# Patient Record
Sex: MALE | Race: BLACK OR AFRICAN AMERICAN | NOT HISPANIC OR LATINO | Employment: FULL TIME | URBAN - METROPOLITAN AREA
[De-identification: names, ages, dates, MRNs, and addresses within clinical notes are randomized per-mention and may not be internally consistent; named-entity substitution may affect disease eponyms.]

---

## 2021-04-28 ENCOUNTER — PATIENT MESSAGE (OUTPATIENT)
Dept: RESEARCH | Facility: HOSPITAL | Age: 66
End: 2021-04-28

## 2022-11-18 ENCOUNTER — LAB VISIT (OUTPATIENT)
Dept: LAB | Facility: HOSPITAL | Age: 67
End: 2022-11-18
Payer: COMMERCIAL

## 2022-11-18 ENCOUNTER — LAB VISIT (OUTPATIENT)
Dept: LAB | Facility: HOSPITAL | Age: 67
End: 2022-11-18
Attending: PSYCHIATRY & NEUROLOGY
Payer: COMMERCIAL

## 2022-11-18 ENCOUNTER — OFFICE VISIT (OUTPATIENT)
Dept: INTERNAL MEDICINE | Facility: CLINIC | Age: 67
End: 2022-11-18
Payer: COMMERCIAL

## 2022-11-18 VITALS
WEIGHT: 178.81 LBS | OXYGEN SATURATION: 99 % | RESPIRATION RATE: 20 BRPM | SYSTOLIC BLOOD PRESSURE: 130 MMHG | HEIGHT: 76 IN | HEART RATE: 90 BPM | TEMPERATURE: 98 F | DIASTOLIC BLOOD PRESSURE: 76 MMHG | BODY MASS INDEX: 21.78 KG/M2

## 2022-11-18 DIAGNOSIS — E11.59 HYPERTENSION ASSOCIATED WITH DIABETES: ICD-10-CM

## 2022-11-18 DIAGNOSIS — D17.1 LIPOMA OF BACK: ICD-10-CM

## 2022-11-18 DIAGNOSIS — I15.2 HYPERTENSION ASSOCIATED WITH DIABETES: ICD-10-CM

## 2022-11-18 DIAGNOSIS — E11.9 TYPE 2 DIABETES MELLITUS WITHOUT COMPLICATION, WITHOUT LONG-TERM CURRENT USE OF INSULIN: ICD-10-CM

## 2022-11-18 DIAGNOSIS — Z00.00 ROUTINE GENERAL MEDICAL EXAMINATION AT A HEALTH CARE FACILITY: ICD-10-CM

## 2022-11-18 DIAGNOSIS — E78.5 HYPERLIPIDEMIA ASSOCIATED WITH TYPE 2 DIABETES MELLITUS: ICD-10-CM

## 2022-11-18 DIAGNOSIS — E11.69 HYPERLIPIDEMIA ASSOCIATED WITH TYPE 2 DIABETES MELLITUS: ICD-10-CM

## 2022-11-18 DIAGNOSIS — Z00.00 ROUTINE GENERAL MEDICAL EXAMINATION AT A HEALTH CARE FACILITY: Primary | ICD-10-CM

## 2022-11-18 DIAGNOSIS — I25.10 CORONARY ARTERY DISEASE INVOLVING NATIVE CORONARY ARTERY OF NATIVE HEART WITHOUT ANGINA PECTORIS: ICD-10-CM

## 2022-11-18 LAB
ALBUMIN SERPL BCP-MCNC: 4.1 G/DL (ref 3.5–5.2)
ALBUMIN/CREAT UR: 43.3 UG/MG (ref 0–30)
ALP SERPL-CCNC: 92 U/L (ref 55–135)
ALT SERPL W/O P-5'-P-CCNC: 14 U/L (ref 10–44)
ANION GAP SERPL CALC-SCNC: 11 MMOL/L (ref 8–16)
AST SERPL-CCNC: 26 U/L (ref 10–40)
BILIRUB SERPL-MCNC: 0.6 MG/DL (ref 0.1–1)
BILIRUB UR QL STRIP: NEGATIVE
BUN SERPL-MCNC: 16 MG/DL (ref 8–23)
CALCIUM SERPL-MCNC: 10.1 MG/DL (ref 8.7–10.5)
CHLORIDE SERPL-SCNC: 103 MMOL/L (ref 95–110)
CHOLEST SERPL-MCNC: 169 MG/DL (ref 120–199)
CHOLEST/HDLC SERPL: 4.2 {RATIO} (ref 2–5)
CLARITY UR: CLEAR
CO2 SERPL-SCNC: 24 MMOL/L (ref 23–29)
COLOR UR: YELLOW
COMPLEXED PSA SERPL-MCNC: 7.8 NG/ML (ref 0–4)
CREAT SERPL-MCNC: 1.4 MG/DL (ref 0.5–1.4)
CREAT UR-MCNC: 187 MG/DL (ref 23–375)
EST. GFR  (NO RACE VARIABLE): 55.8 ML/MIN/1.73 M^2
ESTIMATED AVG GLUCOSE: 200 MG/DL (ref 68–131)
GLUCOSE SERPL-MCNC: 165 MG/DL (ref 70–110)
GLUCOSE UR QL STRIP: NEGATIVE
HBA1C MFR BLD: 8.6 % (ref 4–5.6)
HCV AB SERPL QL IA: NORMAL
HDLC SERPL-MCNC: 40 MG/DL (ref 40–75)
HDLC SERPL: 23.7 % (ref 20–50)
HGB UR QL STRIP: NEGATIVE
HIV 1+2 AB+HIV1 P24 AG SERPL QL IA: NORMAL
KETONES UR QL STRIP: NEGATIVE
LDLC SERPL CALC-MCNC: 102.6 MG/DL (ref 63–159)
LEUKOCYTE ESTERASE UR QL STRIP: NEGATIVE
MICROALBUMIN UR DL<=1MG/L-MCNC: 81 UG/ML
NITRITE UR QL STRIP: NEGATIVE
NONHDLC SERPL-MCNC: 129 MG/DL
PH UR STRIP: 7 [PH] (ref 5–8)
POTASSIUM SERPL-SCNC: 4.5 MMOL/L (ref 3.5–5.1)
PROT SERPL-MCNC: 8.2 G/DL (ref 6–8.4)
PROT UR QL STRIP: NEGATIVE
SODIUM SERPL-SCNC: 138 MMOL/L (ref 136–145)
SP GR UR STRIP: 1.01 (ref 1–1.03)
TRIGL SERPL-MCNC: 132 MG/DL (ref 30–150)
TSH SERPL DL<=0.005 MIU/L-ACNC: 1.24 UIU/ML (ref 0.4–4)
URN SPEC COLLECT METH UR: NORMAL

## 2022-11-18 PROCEDURE — 85025 COMPLETE CBC W/AUTO DIFF WBC: CPT | Performed by: FAMILY MEDICINE

## 2022-11-18 PROCEDURE — 3008F PR BODY MASS INDEX (BMI) DOCUMENTED: ICD-10-PCS | Mod: CPTII,S$GLB,, | Performed by: FAMILY MEDICINE

## 2022-11-18 PROCEDURE — 99387 INIT PM E/M NEW PAT 65+ YRS: CPT | Mod: S$GLB,,, | Performed by: FAMILY MEDICINE

## 2022-11-18 PROCEDURE — 87389 HIV-1 AG W/HIV-1&-2 AB AG IA: CPT | Performed by: FAMILY MEDICINE

## 2022-11-18 PROCEDURE — 84153 ASSAY OF PSA TOTAL: CPT | Performed by: FAMILY MEDICINE

## 2022-11-18 PROCEDURE — 36415 COLL VENOUS BLD VENIPUNCTURE: CPT | Performed by: FAMILY MEDICINE

## 2022-11-18 PROCEDURE — 1160F PR REVIEW ALL MEDS BY PRESCRIBER/CLIN PHARMACIST DOCUMENTED: ICD-10-PCS | Mod: CPTII,S$GLB,, | Performed by: FAMILY MEDICINE

## 2022-11-18 PROCEDURE — 1159F MED LIST DOCD IN RCRD: CPT | Mod: CPTII,S$GLB,, | Performed by: FAMILY MEDICINE

## 2022-11-18 PROCEDURE — 3075F SYST BP GE 130 - 139MM HG: CPT | Mod: CPTII,S$GLB,, | Performed by: FAMILY MEDICINE

## 2022-11-18 PROCEDURE — 3075F PR MOST RECENT SYSTOLIC BLOOD PRESS GE 130-139MM HG: ICD-10-PCS | Mod: CPTII,S$GLB,, | Performed by: FAMILY MEDICINE

## 2022-11-18 PROCEDURE — 3078F DIAST BP <80 MM HG: CPT | Mod: CPTII,S$GLB,, | Performed by: FAMILY MEDICINE

## 2022-11-18 PROCEDURE — 4010F ACE/ARB THERAPY RXD/TAKEN: CPT | Mod: CPTII,S$GLB,, | Performed by: FAMILY MEDICINE

## 2022-11-18 PROCEDURE — 82570 ASSAY OF URINE CREATININE: CPT | Performed by: FAMILY MEDICINE

## 2022-11-18 PROCEDURE — 1160F RVW MEDS BY RX/DR IN RCRD: CPT | Mod: CPTII,S$GLB,, | Performed by: FAMILY MEDICINE

## 2022-11-18 PROCEDURE — 99999 PR PBB SHADOW E&M-EST. PATIENT-LVL IV: ICD-10-PCS | Mod: PBBFAC,,, | Performed by: FAMILY MEDICINE

## 2022-11-18 PROCEDURE — 80061 LIPID PANEL: CPT | Performed by: FAMILY MEDICINE

## 2022-11-18 PROCEDURE — 3008F BODY MASS INDEX DOCD: CPT | Mod: CPTII,S$GLB,, | Performed by: FAMILY MEDICINE

## 2022-11-18 PROCEDURE — 1159F PR MEDICATION LIST DOCUMENTED IN MEDICAL RECORD: ICD-10-PCS | Mod: CPTII,S$GLB,, | Performed by: FAMILY MEDICINE

## 2022-11-18 PROCEDURE — 3078F PR MOST RECENT DIASTOLIC BLOOD PRESSURE < 80 MM HG: ICD-10-PCS | Mod: CPTII,S$GLB,, | Performed by: FAMILY MEDICINE

## 2022-11-18 PROCEDURE — 4010F PR ACE/ARB THEARPY RXD/TAKEN: ICD-10-PCS | Mod: CPTII,S$GLB,, | Performed by: FAMILY MEDICINE

## 2022-11-18 PROCEDURE — 82043 UR ALBUMIN QUANTITATIVE: CPT | Performed by: FAMILY MEDICINE

## 2022-11-18 PROCEDURE — 84443 ASSAY THYROID STIM HORMONE: CPT | Performed by: FAMILY MEDICINE

## 2022-11-18 PROCEDURE — 99387 PR PREVENTIVE VISIT,NEW,65 & OVER: ICD-10-PCS | Mod: S$GLB,,, | Performed by: FAMILY MEDICINE

## 2022-11-18 PROCEDURE — 81003 URINALYSIS AUTO W/O SCOPE: CPT | Performed by: FAMILY MEDICINE

## 2022-11-18 PROCEDURE — 83036 HEMOGLOBIN GLYCOSYLATED A1C: CPT | Performed by: FAMILY MEDICINE

## 2022-11-18 PROCEDURE — 80053 COMPREHEN METABOLIC PANEL: CPT | Performed by: FAMILY MEDICINE

## 2022-11-18 PROCEDURE — 86803 HEPATITIS C AB TEST: CPT | Performed by: FAMILY MEDICINE

## 2022-11-18 PROCEDURE — 99999 PR PBB SHADOW E&M-EST. PATIENT-LVL IV: CPT | Mod: PBBFAC,,, | Performed by: FAMILY MEDICINE

## 2022-11-18 RX ORDER — GLIMEPIRIDE 2 MG/1
2 TABLET ORAL DAILY
COMMUNITY
Start: 2022-11-16

## 2022-11-18 RX ORDER — METOPROLOL SUCCINATE 50 MG/1
50 TABLET, EXTENDED RELEASE ORAL DAILY
COMMUNITY
Start: 2022-11-16

## 2022-11-18 RX ORDER — ATORVASTATIN CALCIUM 40 MG/1
40 TABLET, FILM COATED ORAL DAILY
COMMUNITY
Start: 2022-11-16

## 2022-11-18 RX ORDER — IRBESARTAN 300 MG/1
300 TABLET ORAL DAILY
COMMUNITY
Start: 2022-11-16

## 2022-11-18 RX ORDER — ASPIRIN 81 MG/1
81 TABLET ORAL DAILY
COMMUNITY

## 2022-11-19 LAB
BASOPHILS # BLD AUTO: 0.02 K/UL (ref 0–0.2)
BASOPHILS NFR BLD: 0.2 % (ref 0–1.9)
DIFFERENTIAL METHOD: ABNORMAL
EOSINOPHIL # BLD AUTO: 0.1 K/UL (ref 0–0.5)
EOSINOPHIL NFR BLD: 0.8 % (ref 0–8)
ERYTHROCYTE [DISTWIDTH] IN BLOOD BY AUTOMATED COUNT: 13.2 % (ref 11.5–14.5)
HCT VFR BLD AUTO: 43.5 % (ref 40–54)
HGB BLD-MCNC: 14.1 G/DL (ref 14–18)
IMM GRANULOCYTES # BLD AUTO: 0.01 K/UL (ref 0–0.04)
IMM GRANULOCYTES NFR BLD AUTO: 0.1 % (ref 0–0.5)
LYMPHOCYTES # BLD AUTO: 1.5 K/UL (ref 1–4.8)
LYMPHOCYTES NFR BLD: 18.4 % (ref 18–48)
MCH RBC QN AUTO: 32.3 PG (ref 27–31)
MCHC RBC AUTO-ENTMCNC: 32.4 G/DL (ref 32–36)
MCV RBC AUTO: 100 FL (ref 82–98)
MONOCYTES # BLD AUTO: 0.9 K/UL (ref 0.3–1)
MONOCYTES NFR BLD: 10.7 % (ref 4–15)
NEUTROPHILS # BLD AUTO: 5.7 K/UL (ref 1.8–7.7)
NEUTROPHILS NFR BLD: 69.8 % (ref 38–73)
NRBC BLD-RTO: 0 /100 WBC
PLATELET # BLD AUTO: 286 K/UL (ref 150–450)
PMV BLD AUTO: 9.5 FL (ref 9.2–12.9)
RBC # BLD AUTO: 4.37 M/UL (ref 4.6–6.2)
WBC # BLD AUTO: 8.24 K/UL (ref 3.9–12.7)

## 2022-11-21 DIAGNOSIS — R97.20 ELEVATED PSA, LESS THAN 10 NG/ML: Primary | ICD-10-CM

## 2022-12-13 ENCOUNTER — OFFICE VISIT (OUTPATIENT)
Dept: UROLOGY | Facility: CLINIC | Age: 67
End: 2022-12-13
Payer: COMMERCIAL

## 2022-12-13 ENCOUNTER — LAB VISIT (OUTPATIENT)
Dept: LAB | Facility: HOSPITAL | Age: 67
End: 2022-12-13
Attending: UROLOGY
Payer: COMMERCIAL

## 2022-12-13 VITALS
DIASTOLIC BLOOD PRESSURE: 65 MMHG | HEART RATE: 80 BPM | HEIGHT: 75 IN | BODY MASS INDEX: 22.67 KG/M2 | SYSTOLIC BLOOD PRESSURE: 123 MMHG | RESPIRATION RATE: 18 BRPM | WEIGHT: 182.31 LBS

## 2022-12-13 DIAGNOSIS — N13.8 BPH WITH OBSTRUCTION/LOWER URINARY TRACT SYMPTOMS: ICD-10-CM

## 2022-12-13 DIAGNOSIS — R97.20 ELEVATED PSA, LESS THAN 10 NG/ML: ICD-10-CM

## 2022-12-13 DIAGNOSIS — N40.1 BPH WITH OBSTRUCTION/LOWER URINARY TRACT SYMPTOMS: ICD-10-CM

## 2022-12-13 DIAGNOSIS — R97.20 ELEVATED PSA, LESS THAN 10 NG/ML: Primary | ICD-10-CM

## 2022-12-13 LAB
BUN SERPL-MCNC: 16 MG/DL (ref 8–23)
CREAT SERPL-MCNC: 1.1 MG/DL (ref 0.5–1.4)
EST. GFR  (NO RACE VARIABLE): >60 ML/MIN/1.73 M^2

## 2022-12-13 PROCEDURE — 1101F PR PT FALLS ASSESS DOC 0-1 FALLS W/OUT INJ PAST YR: ICD-10-PCS | Mod: CPTII,S$GLB,, | Performed by: UROLOGY

## 2022-12-13 PROCEDURE — 3066F PR DOCUMENTATION OF TREATMENT FOR NEPHROPATHY: ICD-10-PCS | Mod: CPTII,S$GLB,, | Performed by: UROLOGY

## 2022-12-13 PROCEDURE — 1160F RVW MEDS BY RX/DR IN RCRD: CPT | Mod: CPTII,S$GLB,, | Performed by: UROLOGY

## 2022-12-13 PROCEDURE — 51798 PR MEAS,POST-VOID RES,US,NON-IMAGING: ICD-10-PCS | Mod: S$GLB,,, | Performed by: UROLOGY

## 2022-12-13 PROCEDURE — 4010F PR ACE/ARB THEARPY RXD/TAKEN: ICD-10-PCS | Mod: CPTII,S$GLB,, | Performed by: UROLOGY

## 2022-12-13 PROCEDURE — 99204 OFFICE O/P NEW MOD 45 MIN: CPT | Mod: S$GLB,,, | Performed by: UROLOGY

## 2022-12-13 PROCEDURE — 4010F ACE/ARB THERAPY RXD/TAKEN: CPT | Mod: CPTII,S$GLB,, | Performed by: UROLOGY

## 2022-12-13 PROCEDURE — 1159F PR MEDICATION LIST DOCUMENTED IN MEDICAL RECORD: ICD-10-PCS | Mod: CPTII,S$GLB,, | Performed by: UROLOGY

## 2022-12-13 PROCEDURE — 3066F NEPHROPATHY DOC TX: CPT | Mod: CPTII,S$GLB,, | Performed by: UROLOGY

## 2022-12-13 PROCEDURE — 3288F FALL RISK ASSESSMENT DOCD: CPT | Mod: CPTII,S$GLB,, | Performed by: UROLOGY

## 2022-12-13 PROCEDURE — 3052F HG A1C>EQUAL 8.0%<EQUAL 9.0%: CPT | Mod: CPTII,S$GLB,, | Performed by: UROLOGY

## 2022-12-13 PROCEDURE — 3288F PR FALLS RISK ASSESSMENT DOCUMENTED: ICD-10-PCS | Mod: CPTII,S$GLB,, | Performed by: UROLOGY

## 2022-12-13 PROCEDURE — 82565 ASSAY OF CREATININE: CPT | Performed by: UROLOGY

## 2022-12-13 PROCEDURE — 3074F PR MOST RECENT SYSTOLIC BLOOD PRESSURE < 130 MM HG: ICD-10-PCS | Mod: CPTII,S$GLB,, | Performed by: UROLOGY

## 2022-12-13 PROCEDURE — 36415 COLL VENOUS BLD VENIPUNCTURE: CPT | Mod: PN | Performed by: UROLOGY

## 2022-12-13 PROCEDURE — 99999 PR PBB SHADOW E&M-EST. PATIENT-LVL IV: CPT | Mod: PBBFAC,,, | Performed by: UROLOGY

## 2022-12-13 PROCEDURE — 1101F PT FALLS ASSESS-DOCD LE1/YR: CPT | Mod: CPTII,S$GLB,, | Performed by: UROLOGY

## 2022-12-13 PROCEDURE — 3060F PR POS MICROALBUMINURIA RESULT DOCUMENTED/REVIEW: ICD-10-PCS | Mod: CPTII,S$GLB,, | Performed by: UROLOGY

## 2022-12-13 PROCEDURE — 99204 PR OFFICE/OUTPT VISIT, NEW, LEVL IV, 45-59 MIN: ICD-10-PCS | Mod: S$GLB,,, | Performed by: UROLOGY

## 2022-12-13 PROCEDURE — 3078F PR MOST RECENT DIASTOLIC BLOOD PRESSURE < 80 MM HG: ICD-10-PCS | Mod: CPTII,S$GLB,, | Performed by: UROLOGY

## 2022-12-13 PROCEDURE — 3052F PR MOST RECENT HEMOGLOBIN A1C LEVEL 8.0 - < 9.0%: ICD-10-PCS | Mod: CPTII,S$GLB,, | Performed by: UROLOGY

## 2022-12-13 PROCEDURE — 99999 PR PBB SHADOW E&M-EST. PATIENT-LVL IV: ICD-10-PCS | Mod: PBBFAC,,, | Performed by: UROLOGY

## 2022-12-13 PROCEDURE — 84520 ASSAY OF UREA NITROGEN: CPT | Performed by: UROLOGY

## 2022-12-13 PROCEDURE — 1160F PR REVIEW ALL MEDS BY PRESCRIBER/CLIN PHARMACIST DOCUMENTED: ICD-10-PCS | Mod: CPTII,S$GLB,, | Performed by: UROLOGY

## 2022-12-13 PROCEDURE — 84153 ASSAY OF PSA TOTAL: CPT | Performed by: UROLOGY

## 2022-12-13 PROCEDURE — 1159F MED LIST DOCD IN RCRD: CPT | Mod: CPTII,S$GLB,, | Performed by: UROLOGY

## 2022-12-13 PROCEDURE — 3074F SYST BP LT 130 MM HG: CPT | Mod: CPTII,S$GLB,, | Performed by: UROLOGY

## 2022-12-13 PROCEDURE — 3008F BODY MASS INDEX DOCD: CPT | Mod: CPTII,S$GLB,, | Performed by: UROLOGY

## 2022-12-13 PROCEDURE — 3060F POS MICROALBUMINURIA REV: CPT | Mod: CPTII,S$GLB,, | Performed by: UROLOGY

## 2022-12-13 PROCEDURE — 3008F PR BODY MASS INDEX (BMI) DOCUMENTED: ICD-10-PCS | Mod: CPTII,S$GLB,, | Performed by: UROLOGY

## 2022-12-13 PROCEDURE — 3078F DIAST BP <80 MM HG: CPT | Mod: CPTII,S$GLB,, | Performed by: UROLOGY

## 2022-12-13 PROCEDURE — 51798 US URINE CAPACITY MEASURE: CPT | Mod: S$GLB,,, | Performed by: UROLOGY

## 2022-12-13 RX ORDER — TAMSULOSIN HYDROCHLORIDE 0.4 MG/1
0.4 CAPSULE ORAL DAILY
Qty: 30 CAPSULE | Refills: 11 | Status: SHIPPED | OUTPATIENT
Start: 2022-12-13 | End: 2023-12-13

## 2022-12-13 NOTE — PROGRESS NOTES
Chief Complaint   Patient presents with    Other     Elevated PSA       Referring Provider: Dr. Oriana Tarango     History of Present Illness:   Nieves Torres is a 67 y.o. male here for evaluation of Other (Elevated PSA)    12/13/22-68yo male, here for elevated PSA of 7.8. Pt denies any prior urologic history. Sometimes he has hesitancy. No gross hematuria. Slight dysuria. Sometimes has incomplete emptying. Nocturia x 2.       Review of Systems   Constitutional:  Negative for chills and fever.   Respiratory:  Negative for shortness of breath.    Cardiovascular:  Negative for chest pain.   Gastrointestinal:  Negative for abdominal pain.   Genitourinary:  Positive for nocturia.   Neurological:  Negative for dizziness and syncope.   All other systems reviewed and are negative.      Past Medical History:   Diagnosis Date    Hypertension     Type 2 diabetes mellitus without complications        Past Surgical History:   Procedure Laterality Date    CAROTID STENT         Family History   Problem Relation Age of Onset    Diabetes Mother     Prostate cancer Neg Hx     Breast cancer Neg Hx        Social History     Tobacco Use    Smoking status: Every Day     Packs/day: 0.50     Years: 30.00     Pack years: 15.00     Types: Cigarettes    Smokeless tobacco: Never   Substance Use Topics    Alcohol use: Yes     Alcohol/week: 3.0 standard drinks     Types: 3 Cans of beer per week    Drug use: Never       Current Outpatient Medications   Medication Sig Dispense Refill    aspirin (ECOTRIN) 81 MG EC tablet Take 81 mg by mouth once daily.      atorvastatin (LIPITOR) 40 MG tablet Take 40 mg by mouth once daily.      glimepiride (AMARYL) 2 MG tablet Take 2 mg by mouth once daily.      irbesartan (AVAPRO) 300 MG tablet Take 300 mg by mouth once daily.      metoprolol succinate (TOPROL-XL) 50 MG 24 hr tablet Take 50 mg by mouth once daily.      tamsulosin (FLOMAX) 0.4 mg Cap Take 1 capsule (0.4 mg total) by mouth once daily. 30  capsule 11     No current facility-administered medications for this visit.       Review of patient's allergies indicates:  No Known Allergies    Physical Exam  Vitals:    12/13/22 1445   BP: 123/65   Pulse: 80   Resp: 18       General: Well-developed, well-nourished in no acute distress  HEENT: Normocephalic, atraumatic, Extraocular movements intact  Neck: supple, trachea midline, no cervical or supraclavicular lymphadenopathy  Respirations: even and unlabored  Back: midline spine, no CVA tenderness  Abdomen: soft, Non-tender, non-distended, no organomegaly or palpable masses, no rebound or guarding  Rectal: >40g prostate, no nodules or tenderness. No gross blood  Extremities: atraumatic, moves all equally, no clubbing, cyanosis or edema  Psych: normal affect  Skin: warm and dry, no lesions  Neuro: Alert and oriented, Cranial nerves II-XII grossly intact    PVR: 6cc 12/13/22    Urinalysis  Negative for blood, LE, Nit      Lab Results   Component Value Date    PSA 7.8 (H) 11/18/2022       Assessment:   1. Elevated PSA, less than 10 ng/ml  Ambulatory referral/consult to Urology    Prostate Specific Antigen, Diagnostic    Creatinine, Serum    BUN    MRI Prostate W W/O Contrast      2. BPH with obstruction/lower urinary tract symptoms            Plan:  Elevated PSA, less than 10 ng/ml  -     Ambulatory referral/consult to Urology  -     Prostate Specific Antigen, Diagnostic; Future; Expected date: 12/13/2022  -     Creatinine, Serum; Future; Expected date: 12/13/2022  -     BUN; Future; Expected date: 12/13/2022  -     MRI Prostate W W/O Contrast; Future; Expected date: 12/13/2022    BPH with obstruction/lower urinary tract symptoms    Other orders  -     tamsulosin (FLOMAX) 0.4 mg Cap; Take 1 capsule (0.4 mg total) by mouth once daily.  Dispense: 30 capsule; Refill: 11        Follow up for MRI results.

## 2022-12-14 LAB — COMPLEXED PSA SERPL-MCNC: 6.8 NG/ML (ref 0–4)

## 2022-12-19 ENCOUNTER — TELEPHONE (OUTPATIENT)
Dept: UROLOGY | Facility: CLINIC | Age: 67
End: 2022-12-19
Payer: COMMERCIAL

## 2023-01-05 ENCOUNTER — PATIENT MESSAGE (OUTPATIENT)
Dept: RESEARCH | Facility: HOSPITAL | Age: 68
End: 2023-01-05
Payer: COMMERCIAL

## 2023-12-11 ENCOUNTER — HOSPITAL ENCOUNTER (OUTPATIENT)
Dept: CARDIOLOGY | Facility: HOSPITAL | Age: 68
Discharge: HOME | End: 2023-12-11
Payer: COMMERCIAL

## 2023-12-11 ENCOUNTER — OFFICE VISIT (OUTPATIENT)
Dept: INTERNAL MEDICINE | Facility: CLINIC | Age: 68
End: 2023-12-11
Payer: COMMERCIAL

## 2023-12-11 ENCOUNTER — OFFICE VISIT (OUTPATIENT)
Dept: SURGERY | Facility: CLINIC | Age: 68
End: 2023-12-11
Payer: COMMERCIAL

## 2023-12-11 VITALS
DIASTOLIC BLOOD PRESSURE: 84 MMHG | HEART RATE: 103 BPM | HEIGHT: 75 IN | RESPIRATION RATE: 16 BRPM | WEIGHT: 177 LBS | OXYGEN SATURATION: 95 % | TEMPERATURE: 97 F | BODY MASS INDEX: 22.01 KG/M2 | SYSTOLIC BLOOD PRESSURE: 128 MMHG

## 2023-12-11 VITALS
WEIGHT: 177 LBS | DIASTOLIC BLOOD PRESSURE: 81 MMHG | HEART RATE: 138 BPM | SYSTOLIC BLOOD PRESSURE: 137 MMHG | BODY MASS INDEX: 22.12 KG/M2

## 2023-12-11 DIAGNOSIS — L02.91 ABSCESS: ICD-10-CM

## 2023-12-11 DIAGNOSIS — K60.4 PERIRECTAL FISTULA: ICD-10-CM

## 2023-12-11 DIAGNOSIS — E11.649 UNCONTROLLED TYPE 2 DIABETES MELLITUS WITH HYPOGLYCEMIA, UNSPECIFIED HYPOGLYCEMIA COMA STATUS: ICD-10-CM

## 2023-12-11 DIAGNOSIS — E11.59 HYPERTENSION ASSOCIATED WITH DIABETES: ICD-10-CM

## 2023-12-11 DIAGNOSIS — I15.2 HYPERTENSION ASSOCIATED WITH DIABETES: ICD-10-CM

## 2023-12-11 DIAGNOSIS — L02.91 ABSCESS: Primary | ICD-10-CM

## 2023-12-11 DIAGNOSIS — K61.1 PERIRECTAL ABSCESS: Primary | ICD-10-CM

## 2023-12-11 PROCEDURE — 3075F SYST BP GE 130 - 139MM HG: CPT | Mod: CPTII,S$GLB,,

## 2023-12-11 PROCEDURE — 3079F PR MOST RECENT DIASTOLIC BLOOD PRESSURE 80-89 MM HG: ICD-10-PCS | Mod: CPTII,S$GLB,,

## 2023-12-11 PROCEDURE — 1160F PR REVIEW ALL MEDS BY PRESCRIBER/CLIN PHARMACIST DOCUMENTED: ICD-10-PCS | Mod: CPTII,S$GLB,,

## 2023-12-11 PROCEDURE — 3008F PR BODY MASS INDEX (BMI) DOCUMENTED: ICD-10-PCS | Mod: CPTII,S$GLB,,

## 2023-12-11 PROCEDURE — 1160F RVW MEDS BY RX/DR IN RCRD: CPT | Mod: CPTII,S$GLB,,

## 2023-12-11 PROCEDURE — 1159F PR MEDICATION LIST DOCUMENTED IN MEDICAL RECORD: ICD-10-PCS | Mod: CPTII,S$GLB,,

## 2023-12-11 PROCEDURE — 3079F DIAST BP 80-89 MM HG: CPT | Mod: CPTII,S$GLB,,

## 2023-12-11 PROCEDURE — 1125F PR PAIN SEVERITY QUANTIFIED, PAIN PRESENT: ICD-10-PCS | Mod: CPTII,S$GLB,,

## 2023-12-11 PROCEDURE — 3074F PR MOST RECENT SYSTOLIC BLOOD PRESSURE < 130 MM HG: ICD-10-PCS | Mod: CPTII,S$GLB,,

## 2023-12-11 PROCEDURE — 1125F AMNT PAIN NOTED PAIN PRSNT: CPT | Mod: CPTII,S$GLB,,

## 2023-12-11 PROCEDURE — 99214 OFFICE O/P EST MOD 30 MIN: CPT | Mod: S$GLB,,,

## 2023-12-11 PROCEDURE — 99999 PR PBB SHADOW E&M-EST. PATIENT-LVL V: CPT | Mod: PBBFAC,,,

## 2023-12-11 PROCEDURE — 4010F PR ACE/ARB THEARPY RXD/TAKEN: ICD-10-PCS | Mod: CPTII,S$GLB,,

## 2023-12-11 PROCEDURE — 4010F ACE/ARB THERAPY RXD/TAKEN: CPT | Mod: CPTII,S$GLB,,

## 2023-12-11 PROCEDURE — 99214 PR OFFICE/OUTPT VISIT, EST, LEVL IV, 30-39 MIN: ICD-10-PCS | Mod: S$GLB,,,

## 2023-12-11 PROCEDURE — 3008F BODY MASS INDEX DOCD: CPT | Mod: CPTII,S$GLB,,

## 2023-12-11 PROCEDURE — 93010 ELECTROCARDIOGRAM REPORT: CPT | Mod: ,,, | Performed by: INTERNAL MEDICINE

## 2023-12-11 PROCEDURE — 3075F PR MOST RECENT SYSTOLIC BLOOD PRESS GE 130-139MM HG: ICD-10-PCS | Mod: CPTII,S$GLB,,

## 2023-12-11 PROCEDURE — 99204 OFFICE O/P NEW MOD 45 MIN: CPT | Mod: S$GLB,,,

## 2023-12-11 PROCEDURE — 99204 PR OFFICE/OUTPT VISIT, NEW, LEVL IV, 45-59 MIN: ICD-10-PCS | Mod: S$GLB,,,

## 2023-12-11 PROCEDURE — 93010 EKG 12-LEAD: ICD-10-PCS | Mod: ,,, | Performed by: INTERNAL MEDICINE

## 2023-12-11 PROCEDURE — 3074F SYST BP LT 130 MM HG: CPT | Mod: CPTII,S$GLB,,

## 2023-12-11 PROCEDURE — 1159F MED LIST DOCD IN RCRD: CPT | Mod: CPTII,S$GLB,,

## 2023-12-11 PROCEDURE — 93005 ELECTROCARDIOGRAM TRACING: CPT

## 2023-12-11 PROCEDURE — 99999 PR PBB SHADOW E&M-EST. PATIENT-LVL V: ICD-10-PCS | Mod: PBBFAC,,,

## 2023-12-11 RX ORDER — SODIUM CHLORIDE, SODIUM LACTATE, POTASSIUM CHLORIDE, CALCIUM CHLORIDE 600; 310; 30; 20 MG/100ML; MG/100ML; MG/100ML; MG/100ML
INJECTION, SOLUTION INTRAVENOUS CONTINUOUS
Status: CANCELLED | OUTPATIENT
Start: 2023-12-11

## 2023-12-11 RX ORDER — ONDANSETRON 2 MG/ML
4 INJECTION INTRAMUSCULAR; INTRAVENOUS EVERY 12 HOURS PRN
Status: CANCELLED | OUTPATIENT
Start: 2023-12-11

## 2023-12-11 RX ORDER — OXYCODONE HYDROCHLORIDE 5 MG/1
5 TABLET ORAL EVERY 4 HOURS PRN
Qty: 15 TABLET | Refills: 0 | Status: SHIPPED | OUTPATIENT
Start: 2023-12-11 | End: 2023-12-27 | Stop reason: ALTCHOICE

## 2023-12-11 RX ORDER — MUPIROCIN 20 MG/G
OINTMENT TOPICAL
Qty: 30 G | Refills: 1 | Status: SHIPPED | OUTPATIENT
Start: 2023-12-11

## 2023-12-11 RX ORDER — SULFAMETHOXAZOLE AND TRIMETHOPRIM 800; 160 MG/1; MG/1
1 TABLET ORAL 2 TIMES DAILY
Qty: 20 TABLET | Refills: 0 | Status: SHIPPED | OUTPATIENT
Start: 2023-12-11

## 2023-12-11 NOTE — PRE-PROCEDURE INSTRUCTIONS
Pre op instructions reviewed with patient per phone on 12/11/23: Spoke about pre op process and surgery instructions, verbalized understanding.    Surgery is scheduled on 12/12/23.  We will call you the business day prior to surgery to confirm arrival time (after 2:30 pm), as it is subject to change due to cancellations & emergencies.    Please report to the [facility] (1st Floor) at [facility] located off of [facility] (2nd building on the left, in front of the [facility]),address: [address] [place] [address] [place]. [id number]    INSTRUCTIONS IMPORTANT!!!  Do Not Eat, Drink, or Smoke after 12 midnight! NO WATER after midnight! OK to brush teeth, no gum, candy or mints!    Take only these medicines with a small swallow of water-morning of surgery:  Atorvastatin  Metoprolol    ____  NO Acrylic/fake nails or nail polish worn day of surgery (specifically hand/arm & foot surgeries).  ____  NO powder, lotions, deodorants, oils or creams on body.  ____  Please Remove All jewelry & piercings prior to surgery.  ____  Please Remove Dentures, Hearing Aids & Contact Lens prior to the start of surgery.  ____  Please bring photo ID and insurance information to hospital (Leave Valuables at Home).  ____  If going home the same day, arrange for a ride home. You will not be able to drive 24 hrs if Anesthesia was used.   ____  Wear clean, loose fitting clothing. Allow for dressings, bandages.  ____  Stop all Aspirin products, Ibuprofen, Advil, Motrin & Aleve at least 5-7 days before surgery, unless otherwise instructed by your doctor, or the nurse.   ____  Blood Thinners are stopped based on your Provider's recommendation; Call Surgeon's Office to inquire when to stop/hold.  ____  Stop taking any Fish Oil supplements or Vitamins at least 5 days prior to surgery, unless instructed otherwise by your Doctor.            Diabetic Patients: If you take diabetic medication, do NOT take morning of surgery unless instructed by              Doctor. Metformin to be stopped 24 hrs prior to surgery time. DO NOT take long-acting insulin the evening before surgery. Blood sugars will be checked in pre-op morning of.    Bathing Instructions:    -Do not shave your face or body the day before or the day of surgery.              -Shower & Rinse your body as usual with anti-bacterial Soap (Dial), on the evening prior to surgery and the morning of surgery.               -Rinse your body thoroughly.                -Pat yourself day with a clean, soft towel.               -Do not use lotion, cream, powder or deodorant               -Dress in clean clothes     [facility] Visitor/Ride Policy:  Only 2 adults allowed (over the age of 18) to accompany you to the Hospital. You Must have a ride home from a responsible adult that you know and trust. Medical Transport, Uber or Lyft can only be used if patient has a responsible adult to accompany them during ride home.    Post-Op Instructions: You will receive Post-op/Discharge instructions by your Discharge Nurse prior to going home. Please call your Surgeon's office with any post-surgery questions/concerns.    *Call [facility] Pre-Admissions Department with surgery instruction questions @ [phone] -[place]  or [phone]  (Mon-Fri 8 am to 4 pm)    *If you are running late or have questions the morning of surgery, please call the Surgery Dept @ [phone]  *Insurance/ Financial Questions, please call [phone].

## 2023-12-11 NOTE — PROGRESS NOTES
"Nieves Dorsey Pace  12/11/2023  [id number]    Gauri Mathis MD  Patient Care Team:  Gauri Mathis MD as PCP - General (Family Medicine)          Visit Type:an urgent visit for a new problem    Chief Complaint:  Chief Complaint   Patient presents with    Recurrent Skin Infections     On buttocks, has been present for 3-4 days. Patient thought it would have "come to a head" but it hasn't and is very painful. Rates pain as a 10 today. Cannot sit down properly.         History of Present Illness:    Pt states that he gets recurrent abscess usually every 6 months  On Thursday he noticed an abscess  Started taking left over antibiotics that he had at home  Pain is increasing and unable to sit down   Negative for fever     DM  Lab Results   Component Value Date    HGBA1C 8.6 (H) 11/18/2022   DM is uncontrolled        History:  Past Medical History:   Diagnosis Date    Hypertension     Type 2 diabetes mellitus without complications      Past Surgical History:   Procedure Laterality Date    CAROTID STENT       Family History   Problem Relation Age of Onset    Diabetes Mother     Prostate cancer Neg Hx     Breast cancer Neg Hx      Social History     Socioeconomic History    Marital status:    Tobacco Use    Smoking status: Every Day     Current packs/day: 0.50     Average packs/day: 0.5 packs/day for 30.0 years (15.0 ttl pk-yrs)     Types: Cigarettes    Smokeless tobacco: Never   Substance and Sexual Activity    Alcohol use: Yes     Alcohol/week: 3.0 standard drinks of alcohol     Types: 3 Cans of beer per week    Drug use: Never    Sexual activity: Yes     Patient Active Problem List   Diagnosis    Hypertension associated with diabetes    Hyperlipidemia associated with type 2 diabetes mellitus    Type 2 diabetes mellitus without complication, without long-term current use of insulin    Coronary artery disease involving native coronary artery of native heart without angina pectoris    Lipoma of back     Review of " patient's allergies indicates:  No Known Allergies    The following were reviewed at this visit: active problem list, medication list, allergies, family history, social history, and health maintenance.    Medications:  Current Outpatient Medications on File Prior to Visit   Medication Sig Dispense Refill    aspirin (ECOTRIN) 81 MG EC tablet Take 81 mg by mouth once daily.      atorvastatin (LIPITOR) 40 MG tablet Take 40 mg by mouth once daily.      glimepiride (AMARYL) 2 MG tablet Take 2 mg by mouth once daily.      irbesartan (AVAPRO) 300 MG tablet Take 300 mg by mouth once daily.      metoprolol succinate (TOPROL-XL) 50 MG 24 hr tablet Take 50 mg by mouth once daily.      tamsulosin (FLOMAX) 0.4 mg Cap Take 1 capsule (0.4 mg total) by mouth once daily. 30 capsule 11     No current facility-administered medications on file prior to visit.       Medications have been reviewed and reconciled with patient at this visit.  Barriers to medications reviewed with patient.    Adverse reactions to current medications reviewed with patient..    Over the counter medications reviewed and reconciled with patient.    Exam:  Wt Readings from Last 3 Encounters:   12/13/22 82.7 kg (182 lb 5.1 oz)   11/18/22 81.1 kg (178 lb 12.7 oz)     Temp Readings from Last 3 Encounters:   11/18/22 98.1 °F (36.7 °C) (Tympanic)     BP Readings from Last 3 Encounters:   12/13/22 123/65   11/18/22 130/76     Pulse Readings from Last 3 Encounters:   12/13/22 80   11/18/22 90     There is no height or weight on file to calculate BMI.      Review of Systems   Constitutional:  Negative for fever.   Respiratory:  Negative for shortness of breath.    Skin:         Abscess      Physical Exam  Vitals and nursing note reviewed.   Constitutional:       General: He is not in acute distress.     Appearance: He is well-developed. He is not diaphoretic.   HENT:      Head: Normocephalic and atraumatic.      Right Ear: External ear normal.      Left Ear: External  ear normal.   Eyes:      General:         Right eye: No discharge.         Left eye: No discharge.      Conjunctiva/sclera: Conjunctivae normal.      Pupils: Pupils are equal, round, and reactive to light.   Cardiovascular:      Rate and Rhythm: Normal rate and regular rhythm.      Heart sounds: Normal heart sounds. No murmur heard.  Pulmonary:      Effort: Pulmonary effort is normal. No respiratory distress.      Breath sounds: Normal breath sounds. No wheezing.   Skin:     Comments: Abscess right gluteal crease    Neurological:      Mental Status: He is alert and oriented to person, place, and time.   Psychiatric:         Behavior: Behavior normal.         Thought Content: Thought content normal.         Judgment: Judgment normal.         Laboratory Reviewed ({Yes)  Lab Results   Component Value Date    WBC 8.24 11/18/2022    HGB 14.1 11/18/2022    HCT 43.5 11/18/2022     11/18/2022    CHOL 169 11/18/2022    TRIG 132 11/18/2022    HDL 40 11/18/2022    ALT 14 11/18/2022    AST 26 11/18/2022     11/18/2022    K 4.5 11/18/2022     11/18/2022    CREATININE 1.1 12/13/2022    BUN 16 12/13/2022    CO2 24 11/18/2022    TSH 1.238 11/18/2022    PSA 7.8 (H) 11/18/2022    HGBA1C 8.6 (H) 11/18/2022       Nieves was seen today for recurrent skin infections.    Diagnoses and all orders for this visit:    Abscess  -     Ambulatory referral/consult to General Surgery; Future  -     sulfamethoxazole-trimethoprim 800-160mg (BACTRIM DS) 800-160 mg Tab; Take 1 tablet by mouth 2 (two) times daily.  -     mupirocin (BACTROBAN) 2 % ointment; Apply to bilateral Nostrils two times each for 10 days    Uncontrolled type 2 diabetes mellitus with hypoglycemia, unspecified hypoglycemia coma status  Cont POC as discussed with care team     Hypertension associated with diabetes  At visit, Blood pressure is at goal. Continue current medications      Cover with bactrim  10 days of Bactroban to bilateral nasal passages    Warm  compress to area TID     Was able to get a same day appt with gen surg     Care Plan/Goals: Reviewed    Goals    None         Follow up: No follow-ups on file.    After visit summary was printed and given to patient upon discharge today.  Patient goals and care plan are included in After Visit Summary.

## 2023-12-11 NOTE — H&P (VIEW-ONLY)
History & Physical    SUBJECTIVE:     History of Present Illness:  Patient is a 68 y.o. male presents for evaluation  of left perirectal pain and swelling onset 4 days ago and worsening since then.  He has had similar issues in the same area about 6 times prior to this.  He has never had any surgery in this area before.  The issue will recur every 6 months or so.  He denies any drainage from the anus itself.  He reports having normal bowel movements.  He denies any fevers, chills, nausea, and vomiting.  He has not been on any antibiotics recently.  His past medical history is significant for diabetes,  last A1c 8.6 over a year ago.  He does not check his blood sugars at home.    Chief Complaint   Patient presents with    Abscess     Perirectal, recurrent        Review of patient's allergies indicates:  No Known Allergies    Current Outpatient Medications   Medication Sig Dispense Refill    aspirin (ECOTRIN) 81 MG EC tablet Take 81 mg by mouth once daily.      atorvastatin (LIPITOR) 40 MG tablet Take 40 mg by mouth once daily.      glimepiride (AMARYL) 2 MG tablet Take 2 mg by mouth once daily. 1300      irbesartan (AVAPRO) 300 MG tablet Take 300 mg by mouth once daily.      metoprolol succinate (TOPROL-XL) 50 MG 24 hr tablet Take 50 mg by mouth once daily.      mupirocin (BACTROBAN) 2 % ointment Apply to bilateral Nostrils two times each for 10 days 30 g 1    oxyCODONE (ROXICODONE) 5 MG immediate release tablet Take 1 tablet (5 mg total) by mouth every 4 (four) hours as needed for Pain. 15 tablet 0    sulfamethoxazole-trimethoprim 800-160mg (BACTRIM DS) 800-160 mg Tab Take 1 tablet by mouth 2 (two) times daily. 20 tablet 0    tamsulosin (FLOMAX) 0.4 mg Cap Take 1 capsule (0.4 mg total) by mouth once daily. (Patient taking differently: Take 0.4 mg by mouth daily as needed.) 30 capsule 11     No current facility-administered medications for this visit.       Past Medical History:   Diagnosis Date    Hypertension      Type 2 diabetes mellitus without complications      Past Surgical History:   Procedure Laterality Date    CAROTID STENT       Family History   Problem Relation Age of Onset    Diabetes Mother     Prostate cancer Neg Hx     Breast cancer Neg Hx      Social History     Tobacco Use    Smoking status: Every Day     Current packs/day: 0.50     Average packs/day: 0.5 packs/day for 30.0 years (15.0 ttl pk-yrs)     Types: Cigarettes    Smokeless tobacco: Never    Tobacco comments:     HOLD MIDNIGHT PRIOR   Substance Use Topics    Alcohol use: Yes     Alcohol/week: 3.0 standard drinks of alcohol     Types: 3 Cans of beer per week     Comment: HOLD NOW PRIOR    Drug use: Never        Review of Systems:  Review of Systems   Constitutional:  Positive for activity change, chills and fatigue. Negative for fever and unexpected weight change.   HENT:  Negative for congestion.    Eyes:  Negative for visual disturbance.   Respiratory:  Negative for shortness of breath.    Cardiovascular:  Negative for chest pain.   Gastrointestinal:  Negative for abdominal distention, abdominal pain, constipation, nausea, rectal pain and vomiting.   Genitourinary:  Negative for dysuria and hematuria.   Musculoskeletal:  Negative for arthralgias.   Skin:  Positive for color change. Negative for rash.          Left perirectal pain, swelling   Neurological:  Negative for light-headedness.   Hematological:  Negative for adenopathy.       OBJECTIVE:     Vital Signs (Most Recent)  Pulse: (!) 138 (12/11/23 1004)  BP: 137/81 (12/11/23 1004)     80.3 kg (177 lb)     Physical Exam:  Physical Exam  Vitals reviewed.   Constitutional:       General: He is not in acute distress.     Appearance: He is not toxic-appearing.      Comments:  Visibly uncomfortable, can not sit directly on a chair   HENT:      Head: Normocephalic and atraumatic.      Right Ear: External ear normal.      Left Ear: External ear normal.      Nose: Nose normal.      Mouth/Throat:      Mouth:  Mucous membranes are moist.   Eyes:      Extraocular Movements: Extraocular movements intact.      Conjunctiva/sclera: Conjunctivae normal.   Cardiovascular:      Rate and Rhythm: Tachycardia present.   Pulmonary:      Effort: Pulmonary effort is normal. No respiratory distress.   Genitourinary:     Comments:  Significant induration with central fluctuance and purulent drainage of the left perirectal region.  JOSE ALBERTO and anoscopy deferred.  Skin:     General: Skin is warm and dry.      Findings: Erythema (left perirectal) present.   Neurological:      General: No focal deficit present.      Mental Status: He is alert and oriented to person, place, and time.   Psychiatric:         Behavior: Behavior normal.         Laboratory  Lab Results   Component Value Date    HGBA1C 8.6 (H) 11/18/2022       ASSESSMENT/PLAN:      68-year-old male with left perirectal abscess, recurrent, with concern for perirectal fistula.    -   Discussed with patient that as he has had this issue in the same area multiple times, there is a high likelihood that patient has underlying fistula that would need to be examined in the operating room under anesthesia.  Did offer patient to perform I&D of the abscess today in clinic to relieve the pressure and defer EUA to a later date.  Patient declined and would like to undergo incision and drainage as well as EUA in the operating room tomorrow, which is reasonable. Operative surgeon to obtain informed consent tomorrow.  -   Did discuss need for possible seton drain placement at the time of EUA tomorrow if fistula is identified.  -   We will send oral pain medications in the interim.  He should also start the oral antibiotics prescribed by his primary care provider.  -   Advised patient to proceed to the ED with worsening pain or fevers.  The patient voiced understanding.  -   Preop: CBC, CMP, EKG  -   Return to clinic postoperatively.    Bailey Adamson PA-C  [facility]      I spent a total  of 45 minutes on the day of the visit.This includes face to face time and non-face to face time preparing to see the patient (eg, review of tests), obtaining and/or reviewing separately obtained history, documenting clinical information in the electronic or other health record, independently interpreting results and communicating results to the patient/family/caregiver, or care coordinator.

## 2023-12-12 ENCOUNTER — HOSPITAL ENCOUNTER (OUTPATIENT)
Facility: HOSPITAL | Age: 68
Discharge: HOME | End: 2023-12-12
Attending: SURGERY | Admitting: SURGERY
Payer: COMMERCIAL

## 2023-12-12 ENCOUNTER — ANESTHESIA (OUTPATIENT)
Dept: SURGERY | Facility: HOSPITAL | Age: 68
End: 2023-12-12
Payer: COMMERCIAL

## 2023-12-12 ENCOUNTER — ANESTHESIA EVENT (OUTPATIENT)
Dept: SURGERY | Facility: HOSPITAL | Age: 68
End: 2023-12-12
Payer: COMMERCIAL

## 2023-12-12 DIAGNOSIS — K61.1 PERIRECTAL ABSCESS: Primary | ICD-10-CM

## 2023-12-12 DIAGNOSIS — L02.91 ABSCESS: ICD-10-CM

## 2023-12-12 LAB — POCT GLUCOSE: 231 MG/DL (ref 70–110)

## 2023-12-12 PROCEDURE — 63600175 PHARM REV CODE 636 W HCPCS

## 2023-12-12 PROCEDURE — 71000015 HC POSTOP RECOV 1ST HR: Performed by: SURGERY

## 2023-12-12 PROCEDURE — 36000705 HC OR TIME LEV I EA ADD 15 MIN: Performed by: SURGERY

## 2023-12-12 PROCEDURE — 25000003 PHARM REV CODE 250

## 2023-12-12 PROCEDURE — 63600175 PHARM REV CODE 636 W HCPCS: Performed by: SURGERY

## 2023-12-12 PROCEDURE — 37000008 HC ANESTHESIA 1ST 15 MINUTES: Performed by: SURGERY

## 2023-12-12 PROCEDURE — 37000009 HC ANESTHESIA EA ADD 15 MINS: Performed by: SURGERY

## 2023-12-12 PROCEDURE — 71000033 HC RECOVERY, INTIAL HOUR: Performed by: SURGERY

## 2023-12-12 PROCEDURE — 46040 I&D ISCHIORCT&/PERIRCT ABSC: CPT | Mod: ,,, | Performed by: SURGERY

## 2023-12-12 PROCEDURE — 36000704 HC OR TIME LEV I 1ST 15 MIN: Performed by: SURGERY

## 2023-12-12 PROCEDURE — 25000003 PHARM REV CODE 250: Performed by: SURGERY

## 2023-12-12 PROCEDURE — 46040 PR I&D PERIRECTAL ABSCESS: ICD-10-PCS | Mod: ,,, | Performed by: SURGERY

## 2023-12-12 RX ORDER — LIDOCAINE HYDROCHLORIDE 10 MG/ML
INJECTION, SOLUTION EPIDURAL; INFILTRATION; INTRACAUDAL; PERINEURAL
Status: DISCONTINUED | OUTPATIENT
Start: 2023-12-12 | End: 2023-12-12 | Stop reason: HOSPADM

## 2023-12-12 RX ORDER — SODIUM CHLORIDE, SODIUM LACTATE, POTASSIUM CHLORIDE, CALCIUM CHLORIDE 600; 310; 30; 20 MG/100ML; MG/100ML; MG/100ML; MG/100ML
INJECTION, SOLUTION INTRAVENOUS CONTINUOUS PRN
Status: DISCONTINUED | OUTPATIENT
Start: 2023-12-12 | End: 2023-12-12

## 2023-12-12 RX ORDER — CLOPIDOGREL BISULFATE 75 MG/1
75 TABLET ORAL DAILY
COMMUNITY

## 2023-12-12 RX ORDER — ONDANSETRON 2 MG/ML
INJECTION INTRAMUSCULAR; INTRAVENOUS
Status: DISCONTINUED | OUTPATIENT
Start: 2023-12-12 | End: 2023-12-12

## 2023-12-12 RX ORDER — HYDROCODONE BITARTRATE AND ACETAMINOPHEN 10; 325 MG/1; MG/1
1 TABLET ORAL EVERY 4 HOURS PRN
Status: CANCELLED | OUTPATIENT
Start: 2023-12-12

## 2023-12-12 RX ORDER — LIDOCAINE HYDROCHLORIDE 10 MG/ML
INJECTION, SOLUTION EPIDURAL; INFILTRATION; INTRACAUDAL; PERINEURAL
Status: DISCONTINUED | OUTPATIENT
Start: 2023-12-12 | End: 2023-12-12

## 2023-12-12 RX ORDER — ONDANSETRON 2 MG/ML
4 INJECTION INTRAMUSCULAR; INTRAVENOUS EVERY 12 HOURS PRN
Status: CANCELLED | OUTPATIENT
Start: 2023-12-12

## 2023-12-12 RX ORDER — SODIUM CHLORIDE, SODIUM LACTATE, POTASSIUM CHLORIDE, CALCIUM CHLORIDE 600; 310; 30; 20 MG/100ML; MG/100ML; MG/100ML; MG/100ML
INJECTION, SOLUTION INTRAVENOUS CONTINUOUS
Status: DISCONTINUED | OUTPATIENT
Start: 2023-12-12 | End: 2023-12-12 | Stop reason: HOSPADM

## 2023-12-12 RX ORDER — SODIUM CHLORIDE 9 MG/ML
INJECTION, SOLUTION INTRAVENOUS CONTINUOUS
Status: CANCELLED | OUTPATIENT
Start: 2023-12-12

## 2023-12-12 RX ORDER — ONDANSETRON 2 MG/ML
4 INJECTION INTRAMUSCULAR; INTRAVENOUS EVERY 12 HOURS PRN
Status: DISCONTINUED | OUTPATIENT
Start: 2023-12-12 | End: 2023-12-12 | Stop reason: HOSPADM

## 2023-12-12 RX ORDER — KETAMINE HCL IN 0.9 % NACL 50 MG/5 ML
SYRINGE (ML) INTRAVENOUS
Status: DISCONTINUED | OUTPATIENT
Start: 2023-12-12 | End: 2023-12-12

## 2023-12-12 RX ORDER — FENTANYL CITRATE 50 UG/ML
INJECTION, SOLUTION INTRAMUSCULAR; INTRAVENOUS
Status: DISCONTINUED | OUTPATIENT
Start: 2023-12-12 | End: 2023-12-12

## 2023-12-12 RX ORDER — HYDROCODONE BITARTRATE AND ACETAMINOPHEN 5; 325 MG/1; MG/1
1 TABLET ORAL EVERY 4 HOURS PRN
Status: CANCELLED | OUTPATIENT
Start: 2023-12-12

## 2023-12-12 RX ORDER — OXYCODONE AND ACETAMINOPHEN 5; 325 MG/1; MG/1
1 TABLET ORAL
Status: DISCONTINUED | OUTPATIENT
Start: 2023-12-12 | End: 2023-12-12 | Stop reason: HOSPADM

## 2023-12-12 RX ORDER — MIDAZOLAM HYDROCHLORIDE 1 MG/ML
INJECTION INTRAMUSCULAR; INTRAVENOUS
Status: DISCONTINUED | OUTPATIENT
Start: 2023-12-12 | End: 2023-12-12

## 2023-12-12 RX ORDER — PHENYLEPHRINE HYDROCHLORIDE 10 MG/ML
INJECTION INTRAVENOUS
Status: DISCONTINUED | OUTPATIENT
Start: 2023-12-12 | End: 2023-12-12

## 2023-12-12 RX ORDER — ONDANSETRON 2 MG/ML
4 INJECTION INTRAMUSCULAR; INTRAVENOUS DAILY PRN
Status: DISCONTINUED | OUTPATIENT
Start: 2023-12-12 | End: 2023-12-12 | Stop reason: HOSPADM

## 2023-12-12 RX ORDER — EPHEDRINE SULFATE 50 MG/ML
INJECTION, SOLUTION INTRAVENOUS
Status: DISCONTINUED | OUTPATIENT
Start: 2023-12-12 | End: 2023-12-12

## 2023-12-12 RX ORDER — BUPIVACAINE HYDROCHLORIDE 2.5 MG/ML
INJECTION, SOLUTION EPIDURAL; INFILTRATION; INTRACAUDAL
Status: DISCONTINUED | OUTPATIENT
Start: 2023-12-12 | End: 2023-12-12 | Stop reason: HOSPADM

## 2023-12-12 RX ORDER — HYDROMORPHONE HYDROCHLORIDE 2 MG/ML
0.2 INJECTION, SOLUTION INTRAMUSCULAR; INTRAVENOUS; SUBCUTANEOUS EVERY 5 MIN PRN
Status: DISCONTINUED | OUTPATIENT
Start: 2023-12-12 | End: 2023-12-12 | Stop reason: HOSPADM

## 2023-12-12 RX ORDER — PROPOFOL 10 MG/ML
VIAL (ML) INTRAVENOUS
Status: DISCONTINUED | OUTPATIENT
Start: 2023-12-12 | End: 2023-12-12

## 2023-12-12 RX ADMIN — ONDANSETRON 8 MG: 2 INJECTION INTRAMUSCULAR; INTRAVENOUS at 10:12

## 2023-12-12 RX ADMIN — FENTANYL CITRATE 75 MCG: 50 INJECTION, SOLUTION INTRAMUSCULAR; INTRAVENOUS at 09:12

## 2023-12-12 RX ADMIN — LIDOCAINE HYDROCHLORIDE 60 MG: 10 SOLUTION INTRAVENOUS at 09:12

## 2023-12-12 RX ADMIN — EPHEDRINE SULFATE 20 MG: 50 INJECTION INTRAVENOUS at 10:12

## 2023-12-12 RX ADMIN — Medication 25 MG: at 10:12

## 2023-12-12 RX ADMIN — PHENYLEPHRINE HYDROCHLORIDE 100 MCG: 10 INJECTION INTRAVENOUS at 10:12

## 2023-12-12 RX ADMIN — GLYCOPYRROLATE 0.2 MG: 0.2 INJECTION, SOLUTION INTRAMUSCULAR; INTRAVENOUS at 10:12

## 2023-12-12 RX ADMIN — PROPOFOL 150 MG: 10 INJECTION, EMULSION INTRAVENOUS at 09:12

## 2023-12-12 RX ADMIN — SODIUM CHLORIDE, SODIUM LACTATE, POTASSIUM CHLORIDE, AND CALCIUM CHLORIDE: 600; 310; 30; 20 INJECTION, SOLUTION INTRAVENOUS at 09:12

## 2023-12-12 RX ADMIN — VANCOMYCIN HYDROCHLORIDE 1000 MG: 1 INJECTION, POWDER, LYOPHILIZED, FOR SOLUTION INTRAVENOUS at 09:12

## 2023-12-12 RX ADMIN — MIDAZOLAM HYDROCHLORIDE 2 MG: 1 INJECTION, SOLUTION INTRAMUSCULAR; INTRAVENOUS at 09:12

## 2023-12-12 NOTE — ANESTHESIA POSTPROCEDURE EVALUATION
Anesthesia Post Evaluation    Patient: Nieves Torres    Procedure(s) Performed: Procedure(s) (LRB):  Exam under anesthesia (Left)  INCISION AND DRAINAGE, PERIRECTAL REGION (N/A)    Final Anesthesia Type: general      Patient location during evaluation: PACU  Patient participation: Yes- Able to Participate  Level of consciousness: awake and alert and oriented  Post-procedure vital signs: reviewed and stable  Pain management: adequate  Airway patency: patent  MARIELLE mitigation strategies: Multimodal analgesia  PONV status at discharge: No PONV  Anesthetic complications: no      Cardiovascular status: blood pressure returned to baseline and hemodynamically stable  Respiratory status: unassisted  Hydration status: euvolemic  Follow-up not needed.              Vitals Value Taken Time   /80 12/12/23 1115   Temp 36.1 °C (97 °F) 12/12/23 1025   Pulse 70 12/12/23 1117   Resp 20 12/12/23 1117   SpO2 99 % 12/12/23 1116   Vitals shown include unvalidated device data.      Event Time   Out of Recovery 11:20:43         Pain/Aneta Score: Aneta Score: 10 (12/12/2023 11:15 AM)

## 2023-12-12 NOTE — ANESTHESIA PROCEDURE NOTES
Impression: Exdtve age-rel mclr degn, left eye, with actv chrdl neovas: H35.3221. OS. Condition: improving. Vision: affected. s/p AV OS 08/16/19 . ...w/ Dr. Betty Corrales Hx of multiple DARCI tx's OD at [facility] - no tx's OS Plan: Discussed diagnosis in detail with patient. No treatment is required at this time based on exam and OCT. Recommend observation for now. Will reassess condition in 6 wks.  OCT shows no active leakage Intubation    Date/Time: 12/12/2023 9:55 AM    Performed by: Kurtis Roth CRNA  Authorized by: Arnel Valero MD    Intubation:     Induction:  Intravenous    Intubated:  Postinduction    Mask Ventilation:  Easy mask    Attempts:  1    Attempted By:  CRNA    Difficult Airway Encountered?: No      Complications:  None    Airway Device:  Supraglottic airway/LMA    Airway Device Size:  4.0    Style/Cuff Inflation:  Cuffed (inflated to minimal occlusive pressure)    Placement Verified By:  Capnometry    Complicating Factors:  None    Findings Post-Intubation:  BS equal bilateral

## 2023-12-12 NOTE — ANESTHESIA PREPROCEDURE EVALUATION
12/12/2023  Nieves Torres is a 68 y.o., male with Left perirectal abscess.    Patient Active Problem List   Diagnosis    Hypertension associated with diabetes    Hyperlipidemia associated with type 2 diabetes mellitus    Type 2 diabetes mellitus without complication, without long-term current use of insulin    Coronary artery disease involving native coronary artery of native heart without angina pectoris    Lipoma of back      Past Medical History:   Diagnosis Date    Hypertension     Type 2 diabetes mellitus without complications      Past Surgical History:   Procedure Laterality Date    CAROTID STENT         Chemistry        Component Value Date/Time     12/11/2023 1206    K 4.2 12/11/2023 1206     12/11/2023 1206    CO2 24 12/11/2023 1206    BUN 12 12/11/2023 1206    CREATININE 1.4 12/11/2023 1206     (H) 12/11/2023 1206        Component Value Date/Time    CALCIUM 10.1 12/11/2023 1206    ALKPHOS 122 12/11/2023 1206    AST 17 12/11/2023 1206    ALT 13 12/11/2023 1206    BILITOT 0.6 12/11/2023 1206        Lab Results   Component Value Date    WBC 14.86 (H) 12/11/2023    HGB 14.0 12/11/2023    HCT 42.5 12/11/2023    MCV 97 12/11/2023     12/11/2023       Pre-op Assessment    I have reviewed the Patient Summary Reports.    I have reviewed the NPO Status.   I have reviewed the Medications.     Review of Systems  Anesthesia Hx:  No problems with previous Anesthesia   History of prior surgery of interest to airway management or planning:  Previous anesthesia: General          Denies Personal Hx of Anesthesia complications.                    Social:  Smoker 1 ppd smoker       Hematology/Oncology:  Hematology Normal   Oncology Normal                                   EENT/Dental:  EENT/Dental Normal           Cardiovascular:     Hypertension   CAD          PVD    ECG has been  reviewed. Hx of Carotid stent    On daily ASA and Plavix - TOOK THIS AM    Has been holding ASA                         Pulmonary:  Pulmonary Normal   Denies COPD.    Denies Shortness of breath.  Denies Recent URI.                 Renal/:  Renal/ Normal                 Hepatic/GI:  Hepatic/GI Normal                 Musculoskeletal:  Musculoskeletal Normal                Neurological:  Neurology Normal                                      Endocrine:  Diabetes, poorly controlled, type 2   BMI 22        Dermatological:  Perirectal Abscess    Psych:  Psychiatric Normal                    Physical Exam  General: Well nourished, Cooperative, Alert and Oriented    Airway:  Mallampati: I   Mouth Opening: Normal  TM Distance: Normal  Tongue: Normal  Neck ROM: Normal ROM    Dental:  Intact, Periodontal disease  Patient denies any currently loose or chipped teeth; Patient denies any removable dental appliances        Anesthesia Plan  Type of Anesthesia, risks & benefits discussed:    Anesthesia Type: Gen ETT, Gen Supraglottic Airway, MAC  Intra-op Monitoring Plan: Standard ASA Monitors  Post Op Pain Control Plan: multimodal analgesia and IV/PO Opioids PRN  Induction:  IV  Airway Plan: Direct, Post-Induction  Informed Consent: Informed consent signed with the Patient and all parties understand the risks and agree with anesthesia plan.  All questions answered.   ASA Score: 2  Day of Surgery Review of History & Physical: H&P Update referred to the surgeon/provider.    Ready For Surgery From Anesthesia Perspective.     .

## 2023-12-12 NOTE — OP NOTE
[facility] ([facility])  Surgery Department  Operative Note    SUMMARY     Date of Procedure: 12/12/2023     Procedure:   Exam under anesthesia  Incision and drainage perirectal abscess    Surgeon(s) and Role:     * Scout Lainez MD - Primary    Assistant: GREG Carlos     Pre-Operative Diagnosis: Perirectal abscess    Post-Operative Diagnosis:   Perirectal abscess    Anesthesia: General    Operative Findings (including complications, if any):   Exam under anesthesia  Incision and drainage perirectal abscess    Description of Technical Procedures:  Perianal abscess at 5:00 o'clock    Significant Surgical Tasks Conducted by the Assistant(s), if Applicable: assistance with retraction    Estimated Blood Loss (EBL): 10cc           Implants: * No implants in log *    Specimens:   Specimen (24h ago, onward)      None                    Condition: Good    Disposition: PACU - hemodynamically stable.    Procedure in detail:  The patient was brought to the OR and underwent general anesthesia.  He was prepped and draped in the usual sterile fashion.  The draining abscess cavity was inspected.  There was minimal surrounding cellulitis.  Digital rectal exam was normal with no masses lesions or other defects.  Retractors were placed and pain and will exam was normal without signs of fistula opening or communication to the abscess cavity.  The abscess cavity opening was extended distal weight away from the anus to facilitate drainage.  The wound was irrigated until clear.  The abscess cavity was debrided.  Local anesthetic was injected.  The wound was packed with wet-to-dry gauze.  Patient was transferred to recovery in stable and satisfactory condition.

## 2023-12-12 NOTE — DISCHARGE SUMMARY
[facility] (H[facility])  Discharge Note  Short Stay    Procedure(s) (LRB):  Exam under anesthesia (Left)  INCISION AND DRAINAGE, PERIRECTAL REGION (N/A)      OUTCOME: Patient tolerated treatment/procedure well without complication and is now ready for discharge.    DISPOSITION: Home or Self Care    FINAL DIAGNOSIS:  Abscess    FOLLOWUP: In clinic    DISCHARGE INSTRUCTIONS:    Discharge Procedure Orders   Diet general     Call MD for:  temperature >100.4     Call MD for:  persistent nausea and vomiting     Call MD for:  severe uncontrolled pain     Call MD for:  difficulty breathing, headache or visual disturbances     Call MD for:  redness, tenderness, or signs of infection (pain, swelling, redness, odor or green/yellow discharge around incision site)     Call MD for:  hives     Call MD for:  persistent dizziness or light-headedness     Call MD for:  extreme fatigue     Remove dressing in 24 hours     Wound care routine (specify)   Order Comments: Wound care routine: Wet to dry dressing changes daily     Activity as tolerated     Shower on day dressing removed (No bath)        TIME SPENT ON DISCHARGE: 30 minutes

## 2023-12-12 NOTE — TRANSFER OF CARE
"Anesthesia Transfer of Care Note    Patient: Nieves Torres    Procedure(s) Performed: Procedure(s) (LRB):  Exam under anesthesia (Left)  INCISION AND DRAINAGE, PERIRECTAL REGION (N/A)    Patient location: PACU    Anesthesia Type: general    Transport from OR: Transported from OR on room air with adequate spontaneous ventilation    Post pain: adequate analgesia    Post assessment: no apparent anesthetic complications and tolerated procedure well    Post vital signs: stable    Level of consciousness: awake    Nausea/Vomiting: no nausea/vomiting    Complications: none    Transfer of care protocol was followed      Last vitals: Visit Vitals  BP (!) 152/68 (BP Location: Left arm, Patient Position: Lying)   Pulse 74   Temp 36.1 °C (97 °F) (Temporal)   Resp 18   Ht 6' 3" (1.905 m)   Wt 80.9 kg (178 lb 5.6 oz)   SpO2 100%   BMI 22.29 kg/m²     "

## 2023-12-14 VITALS
DIASTOLIC BLOOD PRESSURE: 82 MMHG | BODY MASS INDEX: 22.18 KG/M2 | HEART RATE: 82 BPM | SYSTOLIC BLOOD PRESSURE: 147 MMHG | RESPIRATION RATE: 12 BRPM | HEIGHT: 75 IN | WEIGHT: 178.38 LBS | OXYGEN SATURATION: 100 % | TEMPERATURE: 97 F

## 2023-12-15 ENCOUNTER — TELEPHONE (OUTPATIENT)
Dept: SURGERY | Facility: CLINIC | Age: 68
End: 2023-12-15
Payer: COMMERCIAL

## 2023-12-15 NOTE — TELEPHONE ENCOUNTER
Left V/M informing patient that I was calling to see how he was recovering after surgery. Advised to call back with any questions or concerns.    Bailey Adamson PA-C  [facility]

## 2023-12-27 ENCOUNTER — OFFICE VISIT (OUTPATIENT)
Dept: SURGERY | Facility: CLINIC | Age: 68
End: 2023-12-27
Payer: COMMERCIAL

## 2023-12-27 VITALS
BODY MASS INDEX: 22.15 KG/M2 | WEIGHT: 178.13 LBS | HEIGHT: 75 IN | TEMPERATURE: 98 F | HEART RATE: 106 BPM | DIASTOLIC BLOOD PRESSURE: 85 MMHG | SYSTOLIC BLOOD PRESSURE: 131 MMHG

## 2023-12-27 DIAGNOSIS — K61.1 PERIRECTAL ABSCESS: Primary | ICD-10-CM

## 2023-12-27 PROBLEM — L02.91 ABSCESS: Chronic | Status: RESOLVED | Noted: 2023-12-12 | Resolved: 2023-12-27

## 2023-12-27 PROCEDURE — 4010F PR ACE/ARB THEARPY RXD/TAKEN: ICD-10-PCS | Mod: CPTII,S$GLB,,

## 2023-12-27 PROCEDURE — 3075F PR MOST RECENT SYSTOLIC BLOOD PRESS GE 130-139MM HG: ICD-10-PCS | Mod: CPTII,S$GLB,,

## 2023-12-27 PROCEDURE — 1159F MED LIST DOCD IN RCRD: CPT | Mod: CPTII,S$GLB,,

## 2023-12-27 PROCEDURE — 1160F PR REVIEW ALL MEDS BY PRESCRIBER/CLIN PHARMACIST DOCUMENTED: ICD-10-PCS | Mod: CPTII,S$GLB,,

## 2023-12-27 PROCEDURE — 3079F PR MOST RECENT DIASTOLIC BLOOD PRESSURE 80-89 MM HG: ICD-10-PCS | Mod: CPTII,S$GLB,,

## 2023-12-27 PROCEDURE — 3075F SYST BP GE 130 - 139MM HG: CPT | Mod: CPTII,S$GLB,,

## 2023-12-27 PROCEDURE — 1125F AMNT PAIN NOTED PAIN PRSNT: CPT | Mod: CPTII,S$GLB,,

## 2023-12-27 PROCEDURE — 3079F DIAST BP 80-89 MM HG: CPT | Mod: CPTII,S$GLB,,

## 2023-12-27 PROCEDURE — 4010F ACE/ARB THERAPY RXD/TAKEN: CPT | Mod: CPTII,S$GLB,,

## 2023-12-27 PROCEDURE — 99024 POSTOP FOLLOW-UP VISIT: CPT | Mod: S$GLB,,,

## 2023-12-27 PROCEDURE — 1159F PR MEDICATION LIST DOCUMENTED IN MEDICAL RECORD: ICD-10-PCS | Mod: CPTII,S$GLB,,

## 2023-12-27 PROCEDURE — 99999 PR PBB SHADOW E&M-EST. PATIENT-LVL IV: ICD-10-PCS | Mod: PBBFAC,,,

## 2023-12-27 PROCEDURE — 99024 PR POST-OP FOLLOW-UP VISIT: ICD-10-PCS | Mod: S$GLB,,,

## 2023-12-27 PROCEDURE — 99999 PR PBB SHADOW E&M-EST. PATIENT-LVL IV: CPT | Mod: PBBFAC,,,

## 2023-12-27 PROCEDURE — 1160F RVW MEDS BY RX/DR IN RCRD: CPT | Mod: CPTII,S$GLB,,

## 2023-12-27 PROCEDURE — 1125F PR PAIN SEVERITY QUANTIFIED, PAIN PRESENT: ICD-10-PCS | Mod: CPTII,S$GLB,,

## 2023-12-27 NOTE — PROGRESS NOTES
History & Physical    SUBJECTIVE:     History of Present Illness:  Patient is a 68 y.o. male presents for evaluation  of left perirectal pain and swelling onset 4 days ago and worsening since then.  He has had similar issues in the same area about 6 times prior to this.  He has never had any surgery in this area before.  The issue will recur every 6 months or so.  He denies any drainage from the anus itself.  He reports having normal bowel movements.  He denies any fevers, chills, nausea, and vomiting.  He has not been on any antibiotics recently.  His past medical history is significant for diabetes,  last A1c 8.6 over a year ago.  He does not check his blood sugars at home.    Interval History:  Since last clinic visit, the patient underwent UA with Christine anal abscess I&D He is feeling much better today than preoperatively.  He is still has some white/green drainage and bloody drainage from the area.  He is unable to pack the wound anymore.  He denies any fevers, chills, nausea, and vomiting.  He is tolerating regular diet and having normal bowel movements.    Chief Complaint   Patient presents with    Post-op Evaluation     S/p EUA, perirectal abscess drainage       Review of patient's allergies indicates:  No Known Allergies    Current Outpatient Medications   Medication Sig Dispense Refill    aspirin (ECOTRIN) 81 MG EC tablet Take 81 mg by mouth once daily.      atorvastatin (LIPITOR) 40 MG tablet Take 40 mg by mouth once daily.      clopidogreL (PLAVIX) 75 mg tablet Take 75 mg by mouth once daily.      glimepiride (AMARYL) 2 MG tablet Take 2 mg by mouth once daily. 1300      irbesartan (AVAPRO) 300 MG tablet Take 300 mg by mouth once daily.      metoprolol succinate (TOPROL-XL) 50 MG 24 hr tablet Take 50 mg by mouth once daily.      mupirocin (BACTROBAN) 2 % ointment Apply to bilateral Nostrils two times each for 10 days 30 g 1    sulfamethoxazole-trimethoprim 800-160mg (BACTRIM DS) 800-160 mg Tab Take 1 tablet  by mouth 2 (two) times daily. 20 tablet 0    tamsulosin (FLOMAX) 0.4 mg Cap Take 1 capsule (0.4 mg total) by mouth once daily. (Patient taking differently: Take 0.4 mg by mouth daily as needed.) 30 capsule 11     No current facility-administered medications for this visit.       Past Medical History:   Diagnosis Date    Abscess 12/12/2023    Hypertension     Type 2 diabetes mellitus without complications      Past Surgical History:   Procedure Laterality Date    CAROTID STENT      EXAMINATION UNDER ANESTHESIA Left 12/12/2023    Procedure: Exam under anesthesia;  Surgeon: Scout Lainez MD;  Location: [facility] OR;  Service: General;  Laterality: Left;    INCISION AND DRAINAGE OF PERIRECTAL REGION N/A 12/12/2023    Procedure: INCISION AND DRAINAGE, PERIRECTAL REGION;  Surgeon: Scout Lainez MD;  Location: [facility] OR;  Service: General;  Laterality: N/A;  abcess     Family History   Problem Relation Age of Onset    Diabetes Mother     Prostate cancer Neg Hx     Breast cancer Neg Hx      Social History     Tobacco Use    Smoking status: Every Day     Current packs/day: 0.50     Average packs/day: 0.5 packs/day for 30.0 years (15.0 ttl pk-yrs)     Types: Cigarettes    Smokeless tobacco: Never    Tobacco comments:     HOLD MIDNIGHT PRIOR   Substance Use Topics    Alcohol use: Yes     Alcohol/week: 3.0 standard drinks of alcohol     Types: 3 Cans of beer per week     Comment: HOLD NOW PRIOR    Drug use: Never        Review of Systems:  Review of Systems   Constitutional:  Negative for chills, fever and unexpected weight change.   HENT:  Negative for congestion.    Eyes:  Negative for visual disturbance.   Respiratory:  Negative for shortness of breath.    Cardiovascular:  Negative for chest pain.   Gastrointestinal:  Negative for abdominal distention, abdominal pain, constipation, nausea, rectal pain and vomiting.   Genitourinary:  Negative for dysuria.   Musculoskeletal:  Negative for arthralgias.   Skin:  Positive for wound.  "Negative for rash.   Neurological:  Negative for light-headedness.   Hematological:  Negative for adenopathy.       OBJECTIVE:     Vital Signs (Most Recent)  Temp: 97.6 °F (36.4 °C) (12/27/23 0834)  Pulse: 106 (12/27/23 0834)  BP: 131/85 (12/27/23 0834)  6' 3" (1.905 m)  80.8 kg (178 lb 2.1 oz)     Physical Exam:  Physical Exam  Vitals reviewed.   Constitutional:       General: He is not in acute distress.     Appearance: He is not toxic-appearing.      Comments:  Visibly uncomfortable, can not sit directly on a chair   HENT:      Head: Normocephalic and atraumatic.      Right Ear: External ear normal.      Left Ear: External ear normal.      Nose: Nose normal.      Mouth/Throat:      Mouth: Mucous membranes are moist.   Eyes:      Extraocular Movements: Extraocular movements intact.      Conjunctiva/sclera: Conjunctivae normal.   Cardiovascular:      Rate and Rhythm: Tachycardia present.   Pulmonary:      Effort: Pulmonary effort is normal. No respiratory distress.   Genitourinary:     Comments: I&D site with healthy bed of granulation tissue with small amount of fibrinous exudate.  Healing as expected.  Skin:     General: Skin is warm and dry.   Neurological:      General: No focal deficit present.      Mental Status: He is alert and oriented to person, place, and time.   Psychiatric:         Behavior: Behavior normal.         Laboratory  Lab Results   Component Value Date    HGBA1C 8.6 (H) 11/18/2022       ASSESSMENT/PLAN:      68-year-old male with left perirectal abscess, recurrent, with concern for perirectal fistula now status post I&D and EUA with open wound recovering as expected.    - continue local wound care as needed.  Discussed that whitish-greenish drainage is fibrinous exudate and not infection.  There are no signs of infection on exam.  - call with any worsening pain, redness, or swelling in the area.  - return to clinic 2-3 weeks for wound check or sooner if needed.      Bailey Adamson, " RADHA  [facility]

## 2024-02-02 ENCOUNTER — PATIENT MESSAGE (OUTPATIENT)
Dept: SURGERY | Facility: HOSPITAL | Age: 69
End: 2024-02-02
Payer: COMMERCIAL

## 2024-02-09 ENCOUNTER — PATIENT MESSAGE (OUTPATIENT)
Dept: SURGERY | Facility: HOSPITAL | Age: 69
End: 2024-02-09
Payer: COMMERCIAL

## 2025-01-22 DIAGNOSIS — Z00.00 ROUTINE GENERAL MEDICAL EXAMINATION AT A HEALTH CARE FACILITY: Primary | ICD-10-CM

## 2025-02-20 ENCOUNTER — PATIENT OUTREACH (OUTPATIENT)
Dept: ADMINISTRATIVE | Facility: HOSPITAL | Age: 70
End: 2025-02-20
Payer: COMMERCIAL

## 2025-02-20 ENCOUNTER — TELEPHONE (OUTPATIENT)
Dept: INTERNAL MEDICINE | Facility: CLINIC | Age: 70
End: 2025-02-20

## 2025-02-20 ENCOUNTER — CLINICAL SUPPORT (OUTPATIENT)
Dept: REHABILITATION | Facility: HOSPITAL | Age: 70
End: 2025-02-20

## 2025-02-20 ENCOUNTER — HOSPITAL ENCOUNTER (OUTPATIENT)
Dept: CARDIOLOGY | Facility: HOSPITAL | Age: 70
Discharge: HOME | End: 2025-02-20

## 2025-02-20 ENCOUNTER — CLINICAL SUPPORT (OUTPATIENT)
Dept: INTERNAL MEDICINE | Facility: CLINIC | Age: 70
End: 2025-02-20
Payer: COMMERCIAL

## 2025-02-20 ENCOUNTER — OFFICE VISIT (OUTPATIENT)
Dept: INTERNAL MEDICINE | Facility: CLINIC | Age: 70
End: 2025-02-20

## 2025-02-20 VITALS
BODY MASS INDEX: 21.19 KG/M2 | SYSTOLIC BLOOD PRESSURE: 147 MMHG | HEIGHT: 76 IN | RESPIRATION RATE: 18 BRPM | DIASTOLIC BLOOD PRESSURE: 82 MMHG | WEIGHT: 174 LBS | TEMPERATURE: 97 F | HEART RATE: 112 BPM

## 2025-02-20 DIAGNOSIS — E11.69 HYPERLIPIDEMIA ASSOCIATED WITH TYPE 2 DIABETES MELLITUS: ICD-10-CM

## 2025-02-20 DIAGNOSIS — E78.5 HYPERLIPIDEMIA ASSOCIATED WITH TYPE 2 DIABETES MELLITUS: ICD-10-CM

## 2025-02-20 DIAGNOSIS — Z00.00 ROUTINE GENERAL MEDICAL EXAMINATION AT A HEALTH CARE FACILITY: Primary | ICD-10-CM

## 2025-02-20 DIAGNOSIS — G89.29 CHRONIC LEFT-SIDED LOW BACK PAIN WITH LEFT-SIDED SCIATICA: ICD-10-CM

## 2025-02-20 DIAGNOSIS — I25.10 CORONARY ARTERY DISEASE INVOLVING NATIVE CORONARY ARTERY OF NATIVE HEART WITHOUT ANGINA PECTORIS: ICD-10-CM

## 2025-02-20 DIAGNOSIS — E11.59 HYPERTENSION ASSOCIATED WITH DIABETES: ICD-10-CM

## 2025-02-20 DIAGNOSIS — Z00.00 ROUTINE GENERAL MEDICAL EXAMINATION AT A HEALTH CARE FACILITY: ICD-10-CM

## 2025-02-20 DIAGNOSIS — E11.9 TYPE 2 DIABETES MELLITUS WITHOUT COMPLICATION, WITHOUT LONG-TERM CURRENT USE OF INSULIN: ICD-10-CM

## 2025-02-20 DIAGNOSIS — I50.22 CHRONIC SYSTOLIC (CONGESTIVE) HEART FAILURE: ICD-10-CM

## 2025-02-20 DIAGNOSIS — I15.2 HYPERTENSION ASSOCIATED WITH DIABETES: ICD-10-CM

## 2025-02-20 DIAGNOSIS — M54.42 CHRONIC LEFT-SIDED LOW BACK PAIN WITH LEFT-SIDED SCIATICA: ICD-10-CM

## 2025-02-20 PROBLEM — F18.10: Status: ACTIVE | Noted: 2025-02-20

## 2025-02-20 PROBLEM — F18.10: Status: RESOLVED | Noted: 2025-02-20 | Resolved: 2025-02-20

## 2025-02-20 LAB
HBA1C MFR BLD: 9 % (ref 4.8–5.6)
OHS QRS DURATION: 80 MS
OHS QTC CALCULATION: 448 MS

## 2025-02-20 PROCEDURE — 97750 PHYSICAL PERFORMANCE TEST: CPT | Performed by: PHYSICAL THERAPIST

## 2025-02-20 PROCEDURE — 93005 ELECTROCARDIOGRAM TRACING: CPT

## 2025-02-20 RX ORDER — METOPROLOL SUCCINATE 50 MG/1
50 TABLET, EXTENDED RELEASE ORAL DAILY
Qty: 90 TABLET | Refills: 1 | Status: SHIPPED | OUTPATIENT
Start: 2025-02-20

## 2025-02-20 RX ORDER — GLIMEPIRIDE 2 MG/1
2 TABLET ORAL DAILY
Qty: 90 TABLET | Refills: 1 | Status: SHIPPED | OUTPATIENT
Start: 2025-02-20

## 2025-02-20 RX ORDER — ATORVASTATIN CALCIUM 40 MG/1
40 TABLET, FILM COATED ORAL NIGHTLY
Qty: 90 TABLET | Refills: 1 | Status: SHIPPED | OUTPATIENT
Start: 2025-02-20

## 2025-02-20 RX ORDER — IRBESARTAN 300 MG/1
300 TABLET ORAL DAILY
Qty: 90 TABLET | Refills: 1 | Status: SHIPPED | OUTPATIENT
Start: 2025-02-20

## 2025-02-20 NOTE — PROGRESS NOTES
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  He does not have a set routine outside of house and yardwork.  Stopped test after 1 Minutes 20 seconds due to pain in the back and unable to keep up his speed.     Patient unable to complete Vital Step following EHE protocol.

## 2025-02-20 NOTE — PROGRESS NOTES
Subjective:       Patient ID: Nieves Torres is a 69 y.o. male presents with Problem List[1]     Chief Complaint: Executive Health    History of Present Illness    Nieves presents today for an executive health physical  for E. He has discontinued all prescribed medications, stating he feels better since stopping them despite being aware they were prescribed. He has a history of congestive heart failure and cardiac stenting with two stents placed several years ago. He denies chest tightness or heart racing. He checks blood sugar periodically at home but is unable to recall his last reading. He has a 30+ year smoking history, starting at age 15-16, and currently smokes one pack of cigarettes daily. He denies marijuana use.  Patient could not tolerate metformin in the past and requesting refills on all his medications.  Reports that he has been having left-sided low back pain radiating to the left leg off and on lately and has tried over-the-counter NSAIDs with no relief, no recent injury or trauma reported and reports that he has been having low back pain for a long time       ROS:  General: -fever, -chills, -fatigue, -weight gain, -weight loss, -loss of appetite  Eyes: -vision changes, -blurry vision, -eye pain, -eye discharge  ENT: -ear pain, -hearing loss, -tinnitus, -nasal congestion, -sore throat  Cardiovascular: -chest pain, -palpitations, -lower extremity edema, -chest tightness  Respiratory: -cough, -shortness of breath, -wheezing, -sputum production  Endocrine: -polyuria, -polydipsia, -heat intolerance, -cold intolerance  Gastrointestinal: -abdominal pain, -heartburn, -nausea, -vomiting, -diarrhea, -constipation, -blood in stool  Genitourinary: -dysuria, -urgency, -frequency, -hematuria, -nocturia, -incontinence  Heme & Lymphatic: -easy or excessive bleeding, -easy bruising, -swollen lymph nodes  Musculoskeletal: -muscle pain, +back pain, -joint pain, -joint swelling  Skin: -rash, -lesion, -itching, -skin  "texture changes, -skin color changes  Neurological: -headache, -dizziness, +numbness, +tingling, -seizure activity, -speech difficulty, -memory loss, -confusion  Psychiatric: -anxiety, -depression, -sleep difficulty                BP (!) 147/82 (BP Location: Left arm, Patient Position: Sitting)   Pulse (!) 112   Temp 96.9 °F (36.1 °C) (Tympanic)   Resp 18   Ht 6' 3.5" (1.918 m)   Wt 78.9 kg (174 lb)   BMI 21.46 kg/m²   Objective:      Physical Exam  Constitutional:       Appearance: He is well-developed.   HENT:      Head: Normocephalic and atraumatic.   Cardiovascular:      Rate and Rhythm: Normal rate and regular rhythm.      Heart sounds: Normal heart sounds. No murmur heard.  Pulmonary:      Effort: Pulmonary effort is normal.      Breath sounds: Normal breath sounds. No wheezing.   Abdominal:      General: Bowel sounds are normal.      Palpations: Abdomen is soft.      Tenderness: There is no abdominal tenderness.   Musculoskeletal:         General: Tenderness present.      Comments: Positive for paraspinal lumbar muscle tenderness noted on the left side   Skin:     General: Skin is warm and dry.      Findings: No rash.   Neurological:      Mental Status: He is alert and oriented to person, place, and time.   Psychiatric:         Mood and Affect: Mood normal.           Assessment/Plan:   1. Routine general medical examination at a health care facility  Vital signs stable today except elevated heart rate.  Clinical exam stable  Continue lifestyle modifications with low-fat and low-cholesterol diet and exercise 30 minutes daily  Will await for labs which has been done this morning for the test results to be available  EKG shows sinus tachycardia    2. Chronic left low back pain with sciatica   Secondary to chronic low back pain patient was advised to continue over-the-counter NSAIDs/Tylenol and warm compresses with stretches recommended, follow-up in 1 month for further evaluation    3. Chronic systolic " (congestive) heart failure  -     metoprolol succinate (TOPROL-XL) 50 MG 24 hr tablet; Take 1 tablet (50 mg total) by mouth once daily.  Dispense: 90 tablet; Refill: 1  -     Ambulatory referral/consult to Cardiology; Future; Expected date: 02/27/2025  Refill will be given on Toprol 50 mg, patient was advised to follow-up with Cardiology  Currently taking aspirin but not Plavix, discuss further with Cardiology    4. Hypertension associated with diabetes  -     irbesartan (AVAPRO) 300 MG tablet; Take 1 tablet (300 mg total) by mouth once daily.  Dispense: 90 tablet; Refill: 1  -     Ambulatory referral/consult to Cardiology; Future; Expected date: 02/27/2025  Blood pressure elevated today, will give refill on Avapro 300 mg daily  Follow-up in 1 month  Follow-up with Cardiology    5. Hyperlipidemia associated with type 2 diabetes mellitus  -     atorvastatin (LIPITOR) 40 MG tablet; Take 1 tablet (40 mg total) by mouth every evening.  Dispense: 90 tablet; Refill: 1  Refill will be sent on Lipitor 40 mg as patient has been out of his medications for past several years.  Follow-up in 1 month    6. Type 2 diabetes mellitus without complication, without long-term current use of insulin  -     glimepiride (AMARYL) 2 MG tablet; Take 1 tablet (2 mg total) by mouth once daily. 1300  Dispense: 90 tablet; Refill: 1  Patient reports that it was not able to tolerate metformin in the past and has not been taking glimepiride  Will check further labs, and follow-up in 1 month  Encouraged to monitor blood glucose levels  Strict lifestyle changes recommended with 1800 ADA low-fat and low-cholesterol diet and exercise 30 minutes daily    7. Coronary artery disease involving native coronary artery of native heart without angina pectoris  Overview:  Dr Demond Villanueva , cards in the past    Orders:  -     metoprolol succinate (TOPROL-XL) 50 MG 24 hr tablet; Take 1 tablet (50 mg total) by mouth once daily.  Dispense: 90 tablet; Refill: 1  -      Ambulatory referral/consult to Cardiology; Future; Expected date: 02/27/2025  Patient has not seen cardiologist lately  Referral will be placed to Cardiology           This note was generated with the assistance of ambient listening technology. Verbal consent was obtained by the patient and accompanying visitor(s) for the recording of patient appointment to facilitate this note. I attest to having reviewed and edited the generated note for accuracy, though some syntax or spelling errors may persist. Please contact the author of this note for any clarification.            [1]   Patient Active Problem List  Diagnosis    Hypertension associated with diabetes    Hyperlipidemia associated with type 2 diabetes mellitus    Type 2 diabetes mellitus without complication, without long-term current use of insulin    Coronary artery disease involving native coronary artery of native heart without angina pectoris    Lipoma of back    Chronic systolic (congestive) heart failure

## 2025-02-20 NOTE — PROGRESS NOTES
"VBC OUTREACH: per chart review pt had a Ofc visit today with Dr Tarango, pt has multiple OVERDUE HM TOPICS, I called and spoke to pt, he stated he did his Labs, and Eye exam today on the " 5th floor", No orders found on chart?  Pt stated he has a follow up appt with Dr Tarango in a month and will do "remaining" screens at follow up.  The following are Overdue    "

## 2025-02-24 ENCOUNTER — TELEPHONE (OUTPATIENT)
Dept: INTERNAL MEDICINE | Facility: CLINIC | Age: 70
End: 2025-02-24
Payer: COMMERCIAL

## 2025-02-24 NOTE — TELEPHONE ENCOUNTER
[facility] called with alerted lab result on 2/21/25 left a VM on nurse line. [employer] called back but this morning but was informed that number was wrong due to [employer] system issues. Representative for [facility] gave another number to call, [phone]. Number called but unable to reach a representative. Received fax with alerted labs from [employer] and physician notified of lab results.

## 2025-02-26 DIAGNOSIS — E11.9 TYPE 2 DIABETES MELLITUS WITHOUT COMPLICATION: ICD-10-CM

## 2025-02-26 DIAGNOSIS — E11.9 TYPE 2 DIABETES MELLITUS WITHOUT COMPLICATION, WITHOUT LONG-TERM CURRENT USE OF INSULIN: ICD-10-CM

## 2025-02-27 ENCOUNTER — RESULTS FOLLOW-UP (OUTPATIENT)
Dept: INTERNAL MEDICINE | Facility: CLINIC | Age: 70
End: 2025-02-27

## 2025-03-20 ENCOUNTER — PATIENT MESSAGE (OUTPATIENT)
Dept: ADMINISTRATIVE | Facility: HOSPITAL | Age: 70
End: 2025-03-20
Payer: COMMERCIAL

## 2025-03-20 ENCOUNTER — OFFICE VISIT (OUTPATIENT)
Dept: INTERNAL MEDICINE | Facility: CLINIC | Age: 70
End: 2025-03-20
Payer: COMMERCIAL

## 2025-03-20 VITALS
RESPIRATION RATE: 18 BRPM | HEART RATE: 78 BPM | HEIGHT: 76 IN | SYSTOLIC BLOOD PRESSURE: 138 MMHG | DIASTOLIC BLOOD PRESSURE: 76 MMHG | OXYGEN SATURATION: 98 % | WEIGHT: 175.06 LBS | BODY MASS INDEX: 21.32 KG/M2

## 2025-03-20 DIAGNOSIS — I25.10 CORONARY ARTERY DISEASE INVOLVING NATIVE CORONARY ARTERY OF NATIVE HEART WITHOUT ANGINA PECTORIS: ICD-10-CM

## 2025-03-20 DIAGNOSIS — E11.59 HYPERTENSION ASSOCIATED WITH DIABETES: Primary | ICD-10-CM

## 2025-03-20 DIAGNOSIS — I15.2 HYPERTENSION ASSOCIATED WITH DIABETES: Primary | ICD-10-CM

## 2025-03-20 DIAGNOSIS — E78.5 HYPERLIPIDEMIA ASSOCIATED WITH TYPE 2 DIABETES MELLITUS: ICD-10-CM

## 2025-03-20 DIAGNOSIS — R97.20 ELEVATED PSA, BETWEEN 10 AND LESS THAN 20 NG/ML: ICD-10-CM

## 2025-03-20 DIAGNOSIS — I50.22 CHRONIC SYSTOLIC (CONGESTIVE) HEART FAILURE: ICD-10-CM

## 2025-03-20 DIAGNOSIS — Z13.6 SCREENING FOR AAA (AORTIC ABDOMINAL ANEURYSM): ICD-10-CM

## 2025-03-20 DIAGNOSIS — E11.9 TYPE 2 DIABETES MELLITUS WITHOUT COMPLICATION, WITHOUT LONG-TERM CURRENT USE OF INSULIN: ICD-10-CM

## 2025-03-20 DIAGNOSIS — E11.69 HYPERLIPIDEMIA ASSOCIATED WITH TYPE 2 DIABETES MELLITUS: ICD-10-CM

## 2025-03-20 DIAGNOSIS — Z72.0 TOBACCO USE: ICD-10-CM

## 2025-03-20 LAB
CHOLEST SERPL-MCNC: 103 MG/DL (ref 0–200)
CHOLEST SERPL-MCNC: 178 MG/DL (ref 100–199)
HDLC SERPL-MCNC: 47 MG/DL
LDLC SERPL CALC-MCNC: 112 MG/DL (ref 0–99)
VLDLC SERPL-MCNC: 19 MG/DL (ref 5–40)

## 2025-03-20 PROCEDURE — 4010F ACE/ARB THERAPY RXD/TAKEN: CPT | Mod: CPTII,S$GLB,, | Performed by: FAMILY MEDICINE

## 2025-03-20 PROCEDURE — 3008F BODY MASS INDEX DOCD: CPT | Mod: CPTII,S$GLB,, | Performed by: FAMILY MEDICINE

## 2025-03-20 PROCEDURE — 99999 PR PBB SHADOW E&M-EST. PATIENT-LVL V: CPT | Mod: PBBFAC,,, | Performed by: FAMILY MEDICINE

## 2025-03-20 PROCEDURE — 1101F PT FALLS ASSESS-DOCD LE1/YR: CPT | Mod: CPTII,S$GLB,, | Performed by: FAMILY MEDICINE

## 2025-03-20 PROCEDURE — 1159F MED LIST DOCD IN RCRD: CPT | Mod: CPTII,S$GLB,, | Performed by: FAMILY MEDICINE

## 2025-03-20 PROCEDURE — 99214 OFFICE O/P EST MOD 30 MIN: CPT | Mod: S$GLB,,, | Performed by: FAMILY MEDICINE

## 2025-03-20 PROCEDURE — 3075F SYST BP GE 130 - 139MM HG: CPT | Mod: CPTII,S$GLB,, | Performed by: FAMILY MEDICINE

## 2025-03-20 PROCEDURE — 3288F FALL RISK ASSESSMENT DOCD: CPT | Mod: CPTII,S$GLB,, | Performed by: FAMILY MEDICINE

## 2025-03-20 PROCEDURE — G2211 COMPLEX E/M VISIT ADD ON: HCPCS | Mod: S$GLB,,, | Performed by: FAMILY MEDICINE

## 2025-03-20 PROCEDURE — 3078F DIAST BP <80 MM HG: CPT | Mod: CPTII,S$GLB,, | Performed by: FAMILY MEDICINE

## 2025-03-20 NOTE — PROGRESS NOTES
"Subjective:       Patient ID: Nieves Torres is a 69 y.o. male presents with Problem List[1]     Chief Complaint: Follow-up (1 Month)    69-year-old male patient with Patient Active Problem List:     Hypertension associated with diabetes     Hyperlipidemia associated with type 2 diabetes mellitus     Type 2 diabetes mellitus without complication, without long-term current use of insulin     Coronary artery disease involving native coronary artery of native heart without angina pectoris     Lipoma of back     Chronic systolic (congestive) heart failure     Tobacco use     Elevated PSA, between 10 and less than 20 ng/ml  Here for follow-up  Patient has appointment with Urology tomorrow to discuss about elevated PSA levels and reports that he has been out of Flomax for more than a year and has been having increased urinary frequency and urgency  Patient has not been monitoring his sugars regularly and reports that he has been checking it once a week, not interested in taking metformin or insulin and started taking Amaryl 2 mg.   Denies of any chest tightness or difficulty breathing with palpitations and is waiting for appointment to be scheduled with cardiologist Dr. Demond edwards      Review of Systems   Constitutional:  Negative for appetite change and fatigue.   Eyes:  Negative for visual disturbance.   Respiratory:  Negative for shortness of breath.    Cardiovascular:  Negative for chest pain, palpitations and leg swelling.   Gastrointestinal:  Negative for abdominal pain, nausea and vomiting.   Genitourinary:  Positive for frequency and urgency.   Musculoskeletal:  Negative for myalgias.   Skin:  Negative for rash.   Neurological:  Negative for headaches.   Psychiatric/Behavioral:  Negative for sleep disturbance.          /76 (BP Location: Right arm, Patient Position: Sitting)   Pulse 78   Resp 18   Ht 6' 3.5" (1.918 m)   Wt 79.4 kg (175 lb 0.7 oz)   SpO2 98%   BMI 21.59 kg/m²   Objective:    "   Physical Exam  Constitutional:       Appearance: He is well-developed.   HENT:      Head: Normocephalic and atraumatic.   Cardiovascular:      Rate and Rhythm: Normal rate and regular rhythm.      Heart sounds: Normal heart sounds. No murmur heard.  Pulmonary:      Effort: Pulmonary effort is normal.      Breath sounds: Normal breath sounds. No wheezing.   Abdominal:      General: Bowel sounds are normal.      Palpations: Abdomen is soft.      Tenderness: There is no abdominal tenderness.   Skin:     General: Skin is warm and dry.      Findings: No rash.   Neurological:      Mental Status: He is alert and oriented to person, place, and time.   Psychiatric:         Mood and Affect: Mood normal.           Assessment/Plan:   1. Hypertension associated with diabetes  Blood pressure is stable currently on Avapro 300 mg daily    2. Hyperlipidemia associated with type 2 diabetes mellitus  Currently taking Lipitor 40 mg, encouraged to be compliant with taking his medications    3. Type 2 diabetes mellitus without complication, without long-term current use of insulin  -     Ambulatory referral/consult to Diabetic Advanced Practice Providers (Medical Management); Future; Expected date: 03/27/2025  -     Ambulatory referral/consult to Optometry; Future; Expected date: 03/27/2025  Previous labs done through [facility] physical showed A1c of 9  Not interested in taking metformin or insulin  Amaryl 2 mg has been started a month ago, Will refer to diabetes clinic  Encouraged to monitor blood glucose levels regularly and will recheck labs in 3 months    4. Coronary artery disease involving native coronary artery of native heart without angina pectoris  Overview:  Dr Demond Villanueva , cards  5. Chronic systolic (congestive) heart failure  Patient to set up appointment with Cardiology Dr. Demond edwards  Currently taking aspirin Plavix and metoprolol    6. Screening for AAA (aortic abdominal aneurysm)  -      AAA Screening;  Future; Expected date: 03/20/2025  Will schedule ultrasound of the abdominal aorta for screening for aneurysm    7. Tobacco use  -     CT Chest Lung Screening Low Dose; Future; Expected date: 03/20/2025  -     Ambulatory referral/consult to Smoking Cessation Program; Future; Expected date: 03/27/2025  Currently smoking 1 pack of cigarettes daily for the last 30 years  Will refer to smoking cessation program and will schedule low-dose CT scan  Patient clinically asymptomatic    8. Elevated PSA, between 10 and less than 20 ng/ml  Reports nocturia, has not been taking Flomax  Patient has appointment to discuss further with Urology tomorrow    Visit today included increased complexity associated with the care of the episodic problem  addressed and managing the longitudinal care of the patient due to the serious and/or complex managed problem(s) .                   [1]   Patient Active Problem List  Diagnosis    Hypertension associated with diabetes    Hyperlipidemia associated with type 2 diabetes mellitus    Type 2 diabetes mellitus without complication, without long-term current use of insulin    Coronary artery disease involving native coronary artery of native heart without angina pectoris    Lipoma of back    Chronic systolic (congestive) heart failure    Tobacco use    Elevated PSA, between 10 and less than 20 ng/ml

## 2025-03-21 ENCOUNTER — RESULTS FOLLOW-UP (OUTPATIENT)
Dept: INTERNAL MEDICINE | Facility: CLINIC | Age: 70
End: 2025-03-21
Payer: MEDICARE

## 2025-03-21 ENCOUNTER — OFFICE VISIT (OUTPATIENT)
Dept: UROLOGY | Facility: CLINIC | Age: 70
End: 2025-03-21
Payer: COMMERCIAL

## 2025-03-21 ENCOUNTER — HOSPITAL ENCOUNTER (OUTPATIENT)
Dept: RADIOLOGY | Facility: HOSPITAL | Age: 70
Discharge: HOME | End: 2025-03-21
Attending: FAMILY MEDICINE
Payer: COMMERCIAL

## 2025-03-21 ENCOUNTER — RESULTS FOLLOW-UP (OUTPATIENT)
Dept: UROLOGY | Facility: CLINIC | Age: 70
End: 2025-03-21

## 2025-03-21 VITALS
SYSTOLIC BLOOD PRESSURE: 158 MMHG | HEIGHT: 76 IN | HEART RATE: 89 BPM | DIASTOLIC BLOOD PRESSURE: 75 MMHG | WEIGHT: 173.31 LBS | BODY MASS INDEX: 21.1 KG/M2 | RESPIRATION RATE: 18 BRPM

## 2025-03-21 DIAGNOSIS — R97.20 ELEVATED PSA: Primary | ICD-10-CM

## 2025-03-21 DIAGNOSIS — R93.89 ABNORMAL SCREENING CT OF CHEST: Primary | ICD-10-CM

## 2025-03-21 DIAGNOSIS — N13.8 BPH WITH OBSTRUCTION/LOWER URINARY TRACT SYMPTOMS: ICD-10-CM

## 2025-03-21 DIAGNOSIS — N40.1 BPH WITH OBSTRUCTION/LOWER URINARY TRACT SYMPTOMS: ICD-10-CM

## 2025-03-21 DIAGNOSIS — Z13.6 SCREENING FOR AAA (AORTIC ABDOMINAL ANEURYSM): ICD-10-CM

## 2025-03-21 LAB
BILIRUB UR QL STRIP: NEGATIVE
GLUCOSE UR QL STRIP: NEGATIVE
KETONES UR QL STRIP: NEGATIVE
LEUKOCYTE ESTERASE UR QL STRIP: NEGATIVE
PH, POC UA: 5.5
POC BLOOD, URINE: NEGATIVE
POC NITRATES, URINE: NEGATIVE
POC RESIDUAL URINE VOLUME: 91 ML (ref 0–100)
PROT UR QL STRIP: NEGATIVE
SP GR UR STRIP: 1.02 (ref 1–1.03)
UROBILINOGEN UR STRIP-ACNC: 0.2 (ref 0.3–2.2)

## 2025-03-21 PROCEDURE — 99999 PR PBB SHADOW E&M-EST. PATIENT-LVL IV: CPT | Mod: PBBFAC,,, | Performed by: UROLOGY

## 2025-03-21 PROCEDURE — 99999 PR PBB SHADOW E&M-EST. PATIENT-LVL I: CPT | Mod: PBBFAC,,, | Performed by: FAMILY MEDICINE

## 2025-03-21 PROCEDURE — 76706 US ABDL AORTA SCREEN AAA: CPT | Mod: 26,,, | Performed by: RADIOLOGY

## 2025-03-21 PROCEDURE — 76706 US ABDL AORTA SCREEN AAA: CPT | Mod: TC

## 2025-03-21 RX ORDER — TAMSULOSIN HYDROCHLORIDE 0.4 MG/1
0.4 CAPSULE ORAL DAILY
Qty: 30 CAPSULE | Refills: 11 | Status: SHIPPED | OUTPATIENT
Start: 2025-03-21 | End: 2026-03-21

## 2025-03-21 NOTE — PROGRESS NOTES
Chief Complaint   Patient presents with    Elevated PSA       Referring Provider: Dr. Oriana Tarango     History of Present Illness:   Nieves Torres is a 69 y.o. male here for evaluation of Elevated PSA    3/21/25-Pt lost to follow up after last visit. Never got the MRI. Can't remember why. Pt reports a weak stream and intermittency. Nocturia x 4. Feelings of incomplete emptying. No gross hematuria. Sometimes he has urgency. No dysuria. No longer taking flomax because he ran out.     12/13/22-66yo male, here for elevated PSA of 7.8. Pt denies any prior urologic history. Sometimes he has hesitancy. No gross hematuria. Slight dysuria. Sometimes has incomplete emptying. Nocturia x 2.       Review of Systems   Constitutional:  Negative for chills and fever.   Respiratory:  Negative for shortness of breath.    Cardiovascular:  Negative for chest pain.   Gastrointestinal:  Negative for abdominal pain.   Genitourinary:  Positive for difficulty urinating and nocturia. Negative for dysuria and hematuria.   Neurological:  Negative for dizziness and syncope.   All other systems reviewed and are negative.        Past Medical History:   Diagnosis Date    Hypertension     Type 2 diabetes mellitus without complications        Past Surgical History:   Procedure Laterality Date    CAROTID STENT      EXAMINATION UNDER ANESTHESIA Left 12/12/2023    Procedure: Exam under anesthesia;  Surgeon: Scout Lainez MD;  Location: [facility] OR;  Service: General;  Laterality: Left;    INCISION AND DRAINAGE OF PERIRECTAL REGION N/A 12/12/2023    Procedure: INCISION AND DRAINAGE, PERIRECTAL REGION;  Surgeon: Scout Lainez MD;  Location: [facility] OR;  Service: General;  Laterality: N/A;  abcess       Family History   Problem Relation Name Age of Onset    Diabetes Mother      Prostate cancer Father      Breast cancer Neg Hx         Social History     Tobacco Use    Smoking status: Every Day     Current packs/day: 1.00     Average packs/day: 0.8  packs/day for 60.1 years (45.1 ttl pk-yrs)     Types: Cigarettes     Start date: 2/20/1995    Smokeless tobacco: Never    Tobacco comments:     HOLD MIDNIGHT PRIOR   Substance Use Topics    Alcohol use: Yes     Alcohol/week: 3.0 standard drinks of alcohol     Types: 3 Cans of beer per week     Comment: HOLD NOW PRIOR    Drug use: Never       Current Outpatient Medications   Medication Sig Dispense Refill    aspirin (ECOTRIN) 81 MG EC tablet Take 81 mg by mouth once daily.      atorvastatin (LIPITOR) 40 MG tablet Take 1 tablet (40 mg total) by mouth every evening. 90 tablet 1    clopidogreL (PLAVIX) 75 mg tablet Take 75 mg by mouth once daily.      glimepiride (AMARYL) 2 MG tablet Take 1 tablet (2 mg total) by mouth once daily. 1300 90 tablet 1    irbesartan (AVAPRO) 300 MG tablet Take 1 tablet (300 mg total) by mouth once daily. 90 tablet 1    metoprolol succinate (TOPROL-XL) 50 MG 24 hr tablet Take 1 tablet (50 mg total) by mouth once daily. 90 tablet 1    tamsulosin (FLOMAX) 0.4 mg Cap Take 1 capsule (0.4 mg total) by mouth once daily. 30 capsule 11     No current facility-administered medications for this visit.       Review of patient's allergies indicates:  No Known Allergies    Physical Exam  Vitals:    03/21/25 0813   BP: (!) 158/75   Pulse: 89   Resp: 18       General: Well-developed, well-nourished in no acute distress  HEENT: Normocephalic, atraumatic, Extraocular movements intact  Neck: supple, trachea midline, no cervical or supraclavicular lymphadenopathy  Respirations: even and unlabored  Back: midline spine, no CVA tenderness  Abdomen: soft, Non-tender, non-distended, no organomegaly or palpable masses, no rebound or guarding  Rectal: 3/21/25->40g prostate, no nodules or tenderness. No gross blood  Extremities: atraumatic, moves all equally, no clubbing, cyanosis or edema  Psych: normal affect  Skin: warm and dry, no lesions  Neuro: Alert and oriented, Cranial nerves II-XII grossly intact    PVR:  91cc    Urinalysis  Negative for blood, LE, Nit      Lab Results   Component Value Date    PSA 7.8 (H) 11/18/2022       Assessment:   1. Elevated PSA  Ambulatory referral/consult to Urology    POCT Urinalysis, Dipstick, Automated, W/O Scope    POCT Bladder Scan    Prostate Specific Antigen, Diagnostic      2. BPH with obstruction/lower urinary tract symptoms  tamsulosin (FLOMAX) 0.4 mg Cap          Plan:  Elevated PSA  -     Ambulatory referral/consult to Urology  -     POCT Urinalysis, Dipstick, Automated, W/O Scope  -     POCT Bladder Scan  -     Prostate Specific Antigen, Diagnostic; Future; Expected date: 03/21/2025    BPH with obstruction/lower urinary tract symptoms  -     tamsulosin (FLOMAX) 0.4 mg Cap; Take 1 capsule (0.4 mg total) by mouth once daily.  Dispense: 30 capsule; Refill: 11    Discussed need for MRI + biopsy if PSA is elevated. Also discussed that if he is not approved or can't afford the MRI, we can still do a biopsy. Will call him to schedule if needed.     Follow up in about 3 months (around 6/21/2025).

## 2025-03-24 ENCOUNTER — TELEPHONE (OUTPATIENT)
Dept: UROLOGY | Facility: CLINIC | Age: 70
End: 2025-03-24
Payer: COMMERCIAL

## 2025-03-24 ENCOUNTER — PATIENT MESSAGE (OUTPATIENT)
Dept: UROLOGY | Facility: CLINIC | Age: 70
End: 2025-03-24
Payer: COMMERCIAL

## 2025-03-24 NOTE — TELEPHONE ENCOUNTER
.Outgoing call placed to patient, patient verified name and date of birth, patient notified of  response to his recent lab work, he verbalized understanding and appointment time changed and no further assistance needed.       ----- Message from Maria Alejandra sent at 3/24/2025  1:58 PM CDT -----  Contact: Nieves  .Type:  Patient Returning CallWho Called:EspSridevi Left Message for Patient:Outgoing call: Juvenal Owens RNDoes the patient know what this is regarding?:missed call he received Would the patient rather a call back or a response via [facility]? Call [facility] Call Back Number:.[phone] Additional Information:

## 2025-03-24 NOTE — TELEPHONE ENCOUNTER
.Outgoing call attempted to notify patient regarding below but no answer, left voice message with contact information letting patient know he can contact us at his earliest convenience for details or questions. [employer] message also sent in regards.    ----- Message from Patricia Bajwa MD sent at 3/21/2025  4:20 PM CDT -----  Please notify pt his PSA has increased to 11.1 now. I would like him to complete an MRI. Please schedule and then schedule a follow up with me within the week after.   ----- Message -----  From: Bg, "[employer] Lab Interface  Sent: 3/21/2025   3:54 PM CDT  To: Patricia Bajwa MD

## 2025-04-01 ENCOUNTER — HOSPITAL ENCOUNTER (OUTPATIENT)
Dept: RADIOLOGY | Facility: HOSPITAL | Age: 70
Discharge: HOME | End: 2025-04-01
Attending: UROLOGY
Payer: COMMERCIAL

## 2025-04-01 DIAGNOSIS — R97.20 ELEVATED PSA: ICD-10-CM

## 2025-04-01 LAB — CREAT SERPL-MCNC: 1.1 MG/DL (ref 0.5–1.4)

## 2025-04-01 PROCEDURE — A9585 GADOBUTROL INJECTION: HCPCS | Mod: PN | Performed by: UROLOGY

## 2025-04-01 PROCEDURE — 72197 MRI PELVIS W/O & W/DYE: CPT | Mod: TC,PN

## 2025-04-01 PROCEDURE — 72197 MRI PELVIS W/O & W/DYE: CPT | Mod: 26,,, | Performed by: RADIOLOGY

## 2025-04-01 PROCEDURE — 25500020 PHARM REV CODE 255: Mod: PN | Performed by: UROLOGY

## 2025-04-01 RX ORDER — GADOBUTROL 604.72 MG/ML
8 INJECTION INTRAVENOUS
Status: COMPLETED | OUTPATIENT
Start: 2025-04-01 | End: 2025-04-01

## 2025-04-01 RX ADMIN — GADOBUTROL 8 ML: 604.72 INJECTION INTRAVENOUS at 02:04

## 2025-04-02 ENCOUNTER — OFFICE VISIT (OUTPATIENT)
Dept: UROLOGY | Facility: CLINIC | Age: 70
End: 2025-04-02
Payer: COMMERCIAL

## 2025-04-02 VITALS
DIASTOLIC BLOOD PRESSURE: 71 MMHG | HEART RATE: 94 BPM | SYSTOLIC BLOOD PRESSURE: 133 MMHG | HEIGHT: 76 IN | WEIGHT: 179 LBS | BODY MASS INDEX: 21.8 KG/M2 | RESPIRATION RATE: 18 BRPM

## 2025-04-02 DIAGNOSIS — Z01.818 PRE-OP TESTING: Primary | ICD-10-CM

## 2025-04-02 DIAGNOSIS — R97.20 ELEVATED PSA: ICD-10-CM

## 2025-04-02 DIAGNOSIS — N13.8 BPH WITH OBSTRUCTION/LOWER URINARY TRACT SYMPTOMS: ICD-10-CM

## 2025-04-02 DIAGNOSIS — N40.1 BPH WITH OBSTRUCTION/LOWER URINARY TRACT SYMPTOMS: ICD-10-CM

## 2025-04-02 LAB
BILIRUB UR QL STRIP: NEGATIVE
GLUCOSE UR QL STRIP: POSITIVE
KETONES UR QL STRIP: NEGATIVE
LEUKOCYTE ESTERASE UR QL STRIP: NEGATIVE
PH, POC UA: 6
POC BLOOD, URINE: NEGATIVE
POC NITRATES, URINE: NEGATIVE
PROT UR QL STRIP: NEGATIVE
SP GR UR STRIP: 1.02 (ref 1–1.03)
UROBILINOGEN UR STRIP-ACNC: 1 (ref 0.3–2.2)

## 2025-04-02 PROCEDURE — 4010F ACE/ARB THERAPY RXD/TAKEN: CPT | Mod: CPTII,S$GLB,, | Performed by: UROLOGY

## 2025-04-02 PROCEDURE — 81003 URINALYSIS AUTO W/O SCOPE: CPT | Mod: QW,S$GLB,, | Performed by: UROLOGY

## 2025-04-02 PROCEDURE — 1159F MED LIST DOCD IN RCRD: CPT | Mod: CPTII,S$GLB,, | Performed by: UROLOGY

## 2025-04-02 PROCEDURE — 3078F DIAST BP <80 MM HG: CPT | Mod: CPTII,S$GLB,, | Performed by: UROLOGY

## 2025-04-02 PROCEDURE — 3288F FALL RISK ASSESSMENT DOCD: CPT | Mod: CPTII,S$GLB,, | Performed by: UROLOGY

## 2025-04-02 PROCEDURE — 1101F PT FALLS ASSESS-DOCD LE1/YR: CPT | Mod: CPTII,S$GLB,, | Performed by: UROLOGY

## 2025-04-02 PROCEDURE — 3008F BODY MASS INDEX DOCD: CPT | Mod: CPTII,S$GLB,, | Performed by: UROLOGY

## 2025-04-02 PROCEDURE — 1125F AMNT PAIN NOTED PAIN PRSNT: CPT | Mod: CPTII,S$GLB,, | Performed by: UROLOGY

## 2025-04-02 PROCEDURE — 99999 PR PBB SHADOW E&M-EST. PATIENT-LVL V: CPT | Mod: PBBFAC,,, | Performed by: UROLOGY

## 2025-04-02 PROCEDURE — 3075F SYST BP GE 130 - 139MM HG: CPT | Mod: CPTII,S$GLB,, | Performed by: UROLOGY

## 2025-04-02 PROCEDURE — 99214 OFFICE O/P EST MOD 30 MIN: CPT | Mod: S$GLB,,, | Performed by: UROLOGY

## 2025-04-02 RX ORDER — CIPROFLOXACIN 2 MG/ML
400 INJECTION, SOLUTION INTRAVENOUS
OUTPATIENT
Start: 2025-04-02

## 2025-04-02 NOTE — PROGRESS NOTES
Chief Complaint   Patient presents with    Follow-up     MRI review        History of Present Illness:   Nieves Torres is a 69 y.o. male here for evaluation of Follow-up (MRI review )    4/2/25-MRI read as PI RADS 4. Taking flomax. Pt reports nocturia x 1-2, but still doesn't feel like he is emptying. No gross hematuria or dysuria.     3/21/25-Pt lost to follow up after last visit. Never got the MRI. Can't remember why. Pt reports a weak stream and intermittency. Nocturia x 4. Feelings of incomplete emptying. No gross hematuria. Sometimes he has urgency. No dysuria. No longer taking flomax because he ran out.     12/13/22-68yo male, here for elevated PSA of 7.8. Pt denies any prior urologic history. Sometimes he has hesitancy. No gross hematuria. Slight dysuria. Sometimes has incomplete emptying. Nocturia x 2.       Review of Systems   Constitutional:  Negative for chills and fever.   Respiratory:  Negative for shortness of breath.    Cardiovascular:  Negative for chest pain.   Gastrointestinal:  Negative for abdominal pain.   Genitourinary:  Positive for difficulty urinating and nocturia. Negative for dysuria and hematuria.   Neurological:  Negative for dizziness and syncope.   All other systems reviewed and are negative.        Past Medical History:   Diagnosis Date    Hypertension     Type 2 diabetes mellitus without complications        Past Surgical History:   Procedure Laterality Date    CAROTID STENT      EXAMINATION UNDER ANESTHESIA Left 12/12/2023    Procedure: Exam under anesthesia;  Surgeon: Scout Lainez MD;  Location: [facility] OR;  Service: General;  Laterality: Left;    INCISION AND DRAINAGE OF PERIRECTAL REGION N/A 12/12/2023    Procedure: INCISION AND DRAINAGE, PERIRECTAL REGION;  Surgeon: Scout Lainez MD;  Location: [facility] OR;  Service: General;  Laterality: N/A;  abcess       Family History   Problem Relation Name Age of Onset    Diabetes Mother      Prostate cancer Father      Breast cancer Neg  Hx         Social History     Tobacco Use    Smoking status: Every Day     Current packs/day: 1.00     Average packs/day: 0.8 packs/day for 60.2 years (45.2 ttl pk-yrs)     Types: Cigarettes     Start date: 2/20/1995    Smokeless tobacco: Never    Tobacco comments:     HOLD MIDNIGHT PRIOR   Substance Use Topics    Alcohol use: Yes     Alcohol/week: 3.0 standard drinks of alcohol     Types: 3 Cans of beer per week     Comment: HOLD NOW PRIOR    Drug use: Never       Current Outpatient Medications   Medication Sig Dispense Refill    aspirin (ECOTRIN) 81 MG EC tablet Take 81 mg by mouth once daily.      atorvastatin (LIPITOR) 40 MG tablet Take 1 tablet (40 mg total) by mouth every evening. 90 tablet 1    clopidogreL (PLAVIX) 75 mg tablet Take 75 mg by mouth once daily.      glimepiride (AMARYL) 2 MG tablet Take 1 tablet (2 mg total) by mouth once daily. 1300 90 tablet 1    irbesartan (AVAPRO) 300 MG tablet Take 1 tablet (300 mg total) by mouth once daily. 90 tablet 1    metoprolol succinate (TOPROL-XL) 50 MG 24 hr tablet Take 1 tablet (50 mg total) by mouth once daily. 90 tablet 1    tamsulosin (FLOMAX) 0.4 mg Cap Take 1 capsule (0.4 mg total) by mouth once daily. 30 capsule 11     No current facility-administered medications for this visit.       Review of patient's allergies indicates:  No Known Allergies    Physical Exam  Vitals:    04/02/25 1432   BP: 133/71   Pulse: 94   Resp: 18       General: Well-developed, well-nourished in no acute distress  HEENT: Normocephalic, atraumatic, Extraocular movements intact  Neck: supple, trachea midline, no cervical or supraclavicular lymphadenopathy  Respirations: even and unlabored  Back: midline spine, no CVA tenderness  Abdomen: soft, Non-tender, non-distended, no organomegaly or palpable masses, no rebound or guarding  Rectal: 3/21/25->40g prostate, no nodules or tenderness. No gross blood  Extremities: atraumatic, moves all equally, no clubbing, cyanosis or edema  Psych:  normal affect  Skin: warm and dry, no lesions  Neuro: Alert and oriented, Cranial nerves II-XII grossly intact      Urinalysis  Negative for blood, LE, Nit    PSA  3/21/25: 11.1    Assessment:   1. Pre-op testing  Urine Culture High Risk    Ambulatory referral/consult to Pre-Admit    X-Ray Chest PA And Lateral Pre-OP    Comprehensive Metabolic Panel    Basic Metabolic Panel      2. Elevated PSA  POCT Urinalysis, Dipstick, Automated, W/O Scope    Place in Outpatient - Extended Recovery    Case Request Operating Room: BIOPSY, PROSTATE    Vital Signs     Diet NPO    Place sequential compression device      3. BPH with obstruction/lower urinary tract symptoms  POCT Urinalysis, Dipstick, Automated, W/O Scope          Plan:  Pre-op testing  -     Urine Culture High Risk; Future; Expected date: 04/02/2025  -     Ambulatory referral/consult to Pre-Admit; Future; Expected date: 04/09/2025  -     X-Ray Chest PA And Lateral Pre-OP; Future; Expected date: 04/02/2025  -     Comprehensive Metabolic Panel; Future; Expected date: 04/02/2025  -     Basic Metabolic Panel; Future; Expected date: 04/02/2025    Elevated PSA  -     POCT Urinalysis, Dipstick, Automated, W/O Scope  -     Place in Outpatient - Extended Recovery; Standing  -     Case Request Operating Room: BIOPSY, PROSTATE  -     Vital Signs ; Standing  -     Diet NPO; Standing  -     Place sequential compression device; Standing    BPH with obstruction/lower urinary tract symptoms  -     POCT Urinalysis, Dipstick, Automated, W/O Scope    Other orders  -     IP VTE LOW RISK PATIENT; Standing  -     ciprofloxacin (CIPRO)400mg/200ml D5W IVPB 400 mg  -     cefTRIAXone (Rocephin) 1 g in D5W 100 mL IVPB (MB+)    Discussed UroNav biopsy in detail, and pt in agreement. Risks/benefits discussed. Due to work schedule, pt requests to be scheduled 6/26/25.   Continue flomax

## 2025-04-09 ENCOUNTER — TELEPHONE (OUTPATIENT)
Dept: UROLOGY | Facility: CLINIC | Age: 70
End: 2025-04-09
Payer: COMMERCIAL

## 2025-04-09 NOTE — TELEPHONE ENCOUNTER
Called back in regards to the message that was left to let them know that patient is having a prostate biopsy. Was placed on hold for 12 minutes without anyone answering had to end phone call due to clinic.        ----- Message from [place] sent at 4/9/2025  1:34 PM CDT -----  Name of Caller:Tracy with [facility] Call Back Number:[phone] Information: She called to confirm what typed of the procedure the patient is having. Thx. EL

## 2025-04-17 ENCOUNTER — HOSPITAL ENCOUNTER (OUTPATIENT)
Dept: RADIOLOGY | Facility: HOSPITAL | Age: 70
Discharge: HOME | End: 2025-04-17
Attending: FAMILY MEDICINE
Payer: COMMERCIAL

## 2025-04-17 DIAGNOSIS — Z72.0 TOBACCO USE: ICD-10-CM

## 2025-04-17 PROCEDURE — 71271 CT THORAX LUNG CANCER SCR C-: CPT | Mod: 26,,, | Performed by: RADIOLOGY

## 2025-04-17 PROCEDURE — 71271 CT THORAX LUNG CANCER SCR C-: CPT | Mod: TC

## 2025-04-23 ENCOUNTER — TELEPHONE (OUTPATIENT)
Dept: DIABETES | Facility: CLINIC | Age: 70
End: 2025-04-23
Payer: COMMERCIAL

## 2025-04-23 NOTE — TELEPHONE ENCOUNTER
I called  to assist him with scheduling a appointment with [facility].  stated he preferred being scheduled at [place] so I offered him the next available on 5/23/25 @2:30pm with Genevieve Devries NP. The patient agreed to this date and time.

## 2025-04-28 DIAGNOSIS — Z00.00 ENCOUNTER FOR MEDICARE ANNUAL WELLNESS EXAM: ICD-10-CM

## 2025-05-13 NOTE — PROGRESS NOTES
"Patient ID: Nieves Torres is a 69 y.o. male.  Patient's current PCP is Oriana Tarango MD.   Collaborating Physician: AV Dixon MD    Chief Complaint: Diabetes Mellitus Consultation   HPI  Nieves Torres is a 69 y.o. Black or  male presenting for a new consult for diabetes.   Patient has been diagnosed with type 2 diabetes for < 5 years.    Pertinent to decision making is/are the following comorbidities:  CAD, CHF, HTN, HLD, nephropathy   Complications related to diabetes: nephropathy  Recent diabetes related hospitalizations: None   Personal history of pancreatitis: denies  Family history of pancreatic cancer in first-degree relative: denies  Family history of MTC/MEN/endocrine tumors: denies       Previous diabetes education: No  Current diet: 2 meals daily, no snacks. 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 No exercise outside of work.       CURRENT DM MEDICATIONS:   Diabetes Medications              glimepiride (AMARYL) 2 MG tablet Take 1 tablet (2 mg total) by mouth once daily. 1300       Past failed treatment(s) include:   Metformin - GI SE       His blood sugar in the clinic today was: No results found for: "POCGLU"    Blood glucose testing Not checking   Any episodes of hypoglycemia? No   Glucose trends: ---         Nieves Torres presents to clinic today to discuss diabetes management.   Reports he is feeling well. Denies s/s hyperglycemia or hypoglycemia. Does not check BG currently but agreeable to start. We discussed dietary recommendations for patients with type 2 diabetes, focusing on a balanced plate consisting of protein + fiber + complex carb. He will start trying sugar-substitutes in tea and coffee rather than real sugar.   We discussed the available anti-diabetic medications; patient endorses fear of needles and is not agreeable to any injectable therapies at this time.   Considering his history of CHF and + " microalbuminuria, I recommended an SGLT2 inhibitor. He is agreeable to start Jardiance if affordable. Reviewed the option of patient pharmacy assistance  (PPA) if cost is too high with insurance. We discussed how Jardiance works and the importance of staying well-hydrated with water. Advised to hold this for any situation in which he is unable to eat or drink normally (illness, planned surgery/procedure).      Labs 2/20/2025:   A1c 9.0%   -- Not taking statin consistently. We discussed LDL goal <55 with h/o CAD   eGFR 80    11/2022:   A1c 8.6, + microalbuminuria       Update A1c and DM urine screening today.   Referral to see Ophthalmology @ [facility]   Due for foot exam today       Of note, Nieves Torres is managed by the following specialists:   Urology -- elevated PSA / planned for biopsy 6/26   Cardiology -- LOV Dr. Villanueva 2023 -- CAD, CHF       Statin: Taking  ACE/ARB: Taking    Labs reviewed and are noted below.    Screening or Prevention Patient's value Goal Complete/Controlled?   HgA1C Testing and Control   Lab Results   Component Value Date    HGBA1C 8.6 (H) 11/18/2022      Annually/Less than 8% No   Lipid profile : 11/18/2022 Annually No   LDL control Lab Results   Component Value Date    LDLCALC 102.6 11/18/2022    Annually/Less than 100 mg/dl  No   Nephropathy screening Lab Results   Component Value Date    MICALBCREAT 43.3 (H) 11/18/2022     Lab Results   Component Value Date    PROTEINUA Negative 11/18/2022    Annually No   Blood pressure BP Readings from Last 1 Encounters:   05/23/25 128/68    Less than 140/90 Yes   Dilated retinal exam Most Recent Eye Exam Date: Not Found Annually Yes    Foot exam   Most Recent Foot Exam Date: Not Found Annually No       Wt Readings from Last 3 Encounters:   05/23/25 1423 81.5 kg (179 lb 10.8 oz)   04/02/25 1432 81.2 kg (179 lb 0.2 oz)   03/21/25 0813 78.6 kg (173 lb 4.5 oz)         Lab Results   Component Value Date    TSH 1.238 11/18/2022    CALCIUM 10.1  "12/11/2023     No results found for: "CPEPTIDE"  No results found for: "GLUTAMICACID"  Glucose   Date Value Ref Range Status   12/11/2023 218 (H) 70 - 110 mg/dL Final     Anion Gap   Date Value Ref Range Status   12/11/2023 14 8 - 16 mmol/L Final           Review of patient's allergies indicates:  No Known Allergies  Social History[1]  Past Medical History:   Diagnosis Date    Hypertension     Type 2 diabetes mellitus without complications        Review of Systems   Constitutional:  Negative for diaphoresis, malaise/fatigue and weight loss.   Eyes:  Negative for blurred vision.   Respiratory:  Negative for shortness of breath.    Cardiovascular:  Negative for chest pain and leg swelling.   Gastrointestinal:  Negative for abdominal pain, constipation, diarrhea, heartburn, nausea and vomiting.   Genitourinary:  Negative for dysuria, frequency and urgency.   Musculoskeletal:  Negative for falls.   Skin:  Negative for rash.   Neurological:  Negative for dizziness, weakness and headaches.   Endo/Heme/Allergies:  Negative for polydipsia.   Psychiatric/Behavioral:  The patient is not nervous/anxious.          Physical Exam  Constitutional:       Appearance: Normal appearance. He is normal weight.   HENT:      Head: Normocephalic and atraumatic.   Neck:      Vascular: No carotid bruit.   Cardiovascular:      Rate and Rhythm: Normal rate and regular rhythm.      Pulses: Normal pulses.           Dorsalis pedis pulses are 2+ on the right side and 2+ on the left side.        Posterior tibial pulses are 2+ on the right side and 2+ on the left side.      Heart sounds: Normal heart sounds.   Pulmonary:      Effort: Pulmonary effort is normal.      Breath sounds: Normal breath sounds.   Abdominal:      General: Abdomen is flat. Bowel sounds are normal.      Palpations: Abdomen is soft.   Musculoskeletal:         General: Normal range of motion.      Cervical back: Neck supple.      Right lower leg: No edema.      Left lower leg: No " edema.   Feet:      Right foot:      Protective Sensation: 6 sites tested.  6 sites sensed.      Skin integrity: Skin integrity normal.      Toenail Condition: Right toenails are long.      Left foot:      Protective Sensation: 6 sites tested.  6 sites sensed.      Skin integrity: Skin integrity normal.      Toenail Condition: Left toenails are long.   Skin:     General: Skin is warm and dry.      Capillary Refill: Capillary refill takes less than 2 seconds.   Neurological:      General: No focal deficit present.      Mental Status: He is alert and oriented to person, place, and time.   Psychiatric:         Mood and Affect: Mood normal.         Behavior: Behavior normal.         Thought Content: Thought content normal.         Judgment: Judgment normal.             Assessment and Plan:   Nieves was seen today for diabetes mellitus.    Diagnoses and all orders for this visit:    Type 2 diabetes mellitus without complication, without long-term current use of insulin  -     Ambulatory referral/consult to Diabetic Advanced Practice Providers (Medical Management)  -     POCT Glucose, Hand-Held Device  -     Hemoglobin A1C; Future  -     Microalbumin/Creatinine Ratio, Urine; Future  -     Comprehensive Metabolic Panel; Future  -     Ambulatory referral/consult to Ophthalmology; Future  -     empagliflozin (JARDIANCE) 10 mg tablet; Take 1 tablet (10 mg total) by mouth once daily.  -     Ambulatory referral/consult to Pharmacy Assistance; Future  -     blood-glucose meter kit; To check BG 3 times daily, to use with insurance preferred meter  -     lancets Misc; To check BG 3 times daily, to use with insurance preferred meter  -     blood sugar diagnostic Strp; To check BG 3 times daily, to use with insurance preferred meter    Hypertension associated with diabetes    Hyperlipidemia associated with type 2 diabetes mellitus    Chronic systolic (congestive) heart failure    Coronary artery disease involving native coronary  artery of native heart without angina pectoris           Treatment plan for today's visit:   - Continue Glimepiride 2 mg daily before breakfast   - Start Jardiance 10 mg once a day -- discussed staying well-hydrated with water. Advised to hold this for any situation in which he is unable to eat or drink normally (illness, planned surgery/procedure).     - Advised to start checking BG and keep record of readings - vary readings so that some are fasting, before meals, and after meals. Bring log with you to future appt.   - Check A1c, CMP, and DM urine screening today.     - Referrals to PPA and Ophthalmology     - Diabetic foot exam performed today - discussed importance of daily foot inspection and always wearing shoes to protect the feet     - Discussed LDL goal <55 with his history of DM + CAD. Start consistent use of Statin and recheck Lipids in 3-6 mos.     Patient education:   - Carbohydrates: Limit to 30-45 grams with each meal. Never eat carbs by themselves - always add protein. Make snacks low carb or non-carb only.    - Blood sugar goals:   Fastin-130 mg/dl  2 hours after meal:  mg/dl  Before bed: 100-150 mg/dl    - Carry glucose tablets/soft peppermints/regular juice or Coke product with you at all times to treat a low blood sugar episode (less than 70).  If your blood sugar is between 50-70, Chew 4 tablets or drink 1/2 cup of juice or regular Coke product.   If your blood sugar is below 50, double the treatment.   Re-check blood sugar in 15 minutes. If still low, repeat this.   Always call the clinic to give an update for any low blood sugar episodes.    - Exercise: Goal is 150 minutes per week, which is about 30 minutes/day, 5 days/week. Start slowly and increase as tolerated.        Follow up: 2 months    Visit today included increased complexity associated with the care of the episodic problems addressed and managing the longitudinal care of the patient due to the serious and/or complex  managed problem(s)       Lauren O. Landry, FNP-C [facility]          [1]   Social History  Socioeconomic History    Marital status:    Occupational History    Occupation: [employer]   Tobacco Use    Smoking status: Every Day     Current packs/day: 1.00     Average packs/day: 0.8 packs/day for 60.3 years (45.3 ttl pk-yrs)     Types: Cigarettes     Start date: 2/20/1995    Smokeless tobacco: Never    Tobacco comments:     HOLD MIDNIGHT PRIOR   Substance and Sexual Activity    Alcohol use: Yes     Alcohol/week: 3.0 standard drinks of alcohol     Types: 3 Cans of beer per week     Comment: HOLD NOW PRIOR    Drug use: Never    Sexual activity: Yes     Social Drivers of Health     Financial Resource Strain: Patient Declined (3/20/2025)    Overall Financial Resource Strain (CARDIA)     Difficulty of Paying Living Expenses: Patient declined   Food Insecurity: Patient Declined (3/20/2025)    Hunger Vital Sign     Worried About Running Out of Food in the Last Year: Patient declined     Ran Out of Food in the Last Year: Patient declined   Transportation Needs: Patient Declined (3/20/2025)    PRAPARE - Transportation     Lack of Transportation (Medical): Patient declined     Lack of Transportation (Non-Medical): Patient declined   Physical Activity: Insufficiently Active (3/20/2025)    Exercise Vital Sign     Days of Exercise per Week: 3 days     Minutes of Exercise per Session: 30 min   Stress: No Stress Concern Present (3/20/2025)    [employer] - Occupational Stress Questionnaire     Feeling of Stress : Not at all   Housing Stability: Unknown (3/20/2025)    Housing Stability Vital Sign     Unable to Pay for Housing in the Last Year: Patient declined     Number of Times Moved in the Last Year: 0     Homeless in the Last Year: No

## 2025-05-23 ENCOUNTER — APPOINTMENT (OUTPATIENT)
Dept: LAB | Facility: HOSPITAL | Age: 70
End: 2025-05-23
Attending: NURSE PRACTITIONER
Payer: MEDICARE

## 2025-05-23 ENCOUNTER — TELEPHONE (OUTPATIENT)
Dept: PHARMACY | Facility: CLINIC | Age: 70
End: 2025-05-23
Payer: MEDICARE

## 2025-05-23 ENCOUNTER — OFFICE VISIT (OUTPATIENT)
Dept: DIABETES | Facility: CLINIC | Age: 70
End: 2025-05-23
Payer: MEDICARE

## 2025-05-23 ENCOUNTER — RESULTS FOLLOW-UP (OUTPATIENT)
Dept: PHARMACY | Facility: CLINIC | Age: 70
End: 2025-05-23

## 2025-05-23 ENCOUNTER — TELEPHONE (OUTPATIENT)
Dept: DIABETES | Facility: CLINIC | Age: 70
End: 2025-05-23
Payer: MEDICARE

## 2025-05-23 VITALS
SYSTOLIC BLOOD PRESSURE: 128 MMHG | HEART RATE: 83 BPM | BODY MASS INDEX: 21.88 KG/M2 | DIASTOLIC BLOOD PRESSURE: 68 MMHG | OXYGEN SATURATION: 95 % | HEIGHT: 76 IN | WEIGHT: 179.69 LBS | RESPIRATION RATE: 16 BRPM

## 2025-05-23 DIAGNOSIS — I15.2 HYPERTENSION ASSOCIATED WITH DIABETES: ICD-10-CM

## 2025-05-23 DIAGNOSIS — E11.9 TYPE 2 DIABETES MELLITUS WITHOUT COMPLICATION, WITHOUT LONG-TERM CURRENT USE OF INSULIN: Primary | ICD-10-CM

## 2025-05-23 DIAGNOSIS — I25.10 CORONARY ARTERY DISEASE INVOLVING NATIVE CORONARY ARTERY OF NATIVE HEART WITHOUT ANGINA PECTORIS: ICD-10-CM

## 2025-05-23 DIAGNOSIS — E78.5 HYPERLIPIDEMIA ASSOCIATED WITH TYPE 2 DIABETES MELLITUS: ICD-10-CM

## 2025-05-23 DIAGNOSIS — I50.22 CHRONIC SYSTOLIC (CONGESTIVE) HEART FAILURE: ICD-10-CM

## 2025-05-23 DIAGNOSIS — E11.59 HYPERTENSION ASSOCIATED WITH DIABETES: ICD-10-CM

## 2025-05-23 DIAGNOSIS — E11.69 HYPERLIPIDEMIA ASSOCIATED WITH TYPE 2 DIABETES MELLITUS: ICD-10-CM

## 2025-05-23 PROCEDURE — 99999 PR PBB SHADOW E&M-EST. PATIENT-LVL V: CPT | Mod: PBBFAC,,, | Performed by: NURSE PRACTITIONER

## 2025-05-23 RX ORDER — INSULIN PUMP SYRINGE, 3 ML
EACH MISCELLANEOUS
Qty: 1 EACH | Refills: 0 | Status: SHIPPED | OUTPATIENT
Start: 2025-05-23 | End: 2026-05-23

## 2025-05-23 RX ORDER — LANCETS
EACH MISCELLANEOUS
Qty: 100 EACH | Refills: 11 | Status: SHIPPED | OUTPATIENT
Start: 2025-05-23

## 2025-05-23 NOTE — TELEPHONE ENCOUNTER
Welcome letter has been sent via phone, letter, and portal message  to inform patient of PAP application process for Jardiance. Follow-up will be made in approximately 5 to 10 business days.    Thank you  Portia Llanos  Pharmacy Patient Assistance Team

## 2025-05-23 NOTE — PROGRESS NOTES
Arely     Mr. Torres's case has been assigned to Portia Llanos @[phone].       What happens next? Assigned advocate will review your patients chart and research available options.  Patient may be asked to provide specific documentation to help determine eligibility. Failure to provide the requested documentation will delay assistance.    Please note all requests are subject to program availability and patient eligibility verification.   Please note each program has it's own unique eligibility criteria (e.g., income limits, insurance status medical condition, residency).Therefore eligibility is determined by the specific program being applied to not by the Pharmacy Patient Assistance Team.  Please note epic chart must reflect a current order for the requested medication written by an [facility] provider to begin PAP process.   Provider may review progress notes by typing pharmacy patient assistance in Epic search box.       Thank you,   [facility]  [address] [facility] [id number]  [place] [address]  Fax: [phone]  Email: [email or web address]

## 2025-05-23 NOTE — TELEPHONE ENCOUNTER
----- Message from Jelena Hummel sent at 5/23/2025  3:41 PM CDT -----  Regarding: Order for PACE,HUBERT KELECHI  Hello,     Mr. Cueva's case has been assigned to Portia Llanos @[phone].       What happens next? Assigned advocate will review your patients chart and research available options.  Patient may be asked to provide specific documentation to help determine eligibility. Failure to provide the requested documentation will delay assistance.    Please note all requests are subject to program availability and patient eligibility verification.   Please note each program has it's own unique eligibility criteria (e.g., income limits, insurance status medical condition, residency).Therefore eligibility is determined by the specific program   being applied to not by the Pharmacy Patient Assistance Team.  Please note epic chart must reflect a current order for the requested medication written by an [facility] provider to begin PAP process.   Provider may review progress notes by typing pharmacy patient assistance in Epic search box.       Thank you,   [facility]  [address] [facility] [id number]  [place] [address]  Fax: [phone]  Email: [email or web address]      ----- Message -----  From: Genevieve Devries NP  Sent: 5/23/2025   2:57 PM CDT  To: Pharmacy Patient Assistance Team  Subject: Order for HUBERT CUEVA

## 2025-05-23 NOTE — LETTER
May 23, 2025    Nieves Torres  [address] [place] [address]             Dear Mr. Torres,    My name is Portia Llanos, and I am reaching out on behalf of [facility]s Pharmacy Patient Assistance Team regarding your request for medication assistance. Our goal is to help qualified [facility] patients obtain financial assistance for prescribed medications.    Please note that enrollment into available support may require the following documents:    Proof of household income(such as social security award letter, pension statement or 3 consecutive pay stubs)  Copy of all insurance cards (front and back)  Completed [facility] authorization forms    If you still need assistance with your medications, please reach out to the phone number listed below. If we do not hear back from you, a second contact attempt will be made via mail or your [facility] portal in 5 to 10 business days.    Thank you for giving us the opportunity to assist you with your healthcare needs. We look forward to working with you.      Sincerely  Portia Viet @[phone]  Pharmacy Patient Assistance Team  [address] [place] [address]  [id number] [place] [address]  Fax: [phone]  Email: [email or web address]

## 2025-05-23 NOTE — PATIENT INSTRUCTIONS
Medications adjusted for today's visit include:   Start Jardiance 10 mg daily -- do not take this medication if you are ill or having a procedure/surgery and not able to eat or drink normally.     I am sending referral to patient pharmacy assistance -- they will contact you to apply for medication cost assistance     Continue Glimepiride 2 mg daily     Check blood sugars -- vary the readings -- check some fasting in the morning, some before meals, and some after meals. Bring your logs with you to your next appointment.       Patient education:   Carbohydrates: Limit to 30-45 grams with each meal. Never eat carbs by themselves - always add protein. Make snacks low carb or non-carb only.    Blood sugar goals:   Fastin-130 mg/dl  2 hours after meal:  mg/dl  Before bed: 100-150 mg/dl    Carry glucose tablets/soft peppermints/regular juice or Coke product with you at all times to treat a low blood sugar episode (less than 70).  If your blood sugar is between 50-70, Chew 4 tablets or drink 1/2 cup of juice or regular Coke product.   If your blood sugar is below 50, double the treatment.   Re-check blood sugar in 15 minutes. If still low, repeat this.   Always call the clinic to give an update for any low blood sugar episodes.    Exercise: Goal is 150 minutes per week, which is about 30 minutes/day, 5 days/week. Start slowly and increase as tolerated.

## 2025-05-25 ENCOUNTER — RESULTS FOLLOW-UP (OUTPATIENT)
Dept: DIABETES | Facility: CLINIC | Age: 70
End: 2025-05-25

## 2025-05-26 ENCOUNTER — TELEPHONE (OUTPATIENT)
Dept: DIABETES | Facility: CLINIC | Age: 70
End: 2025-05-26
Payer: MEDICARE

## 2025-05-26 NOTE — TELEPHONE ENCOUNTER
Pt verbally understood results pt states that he will go to the pharmacy to check the price and than call us back pt was told          Labs reviewed --      A1c is improved at 6.6%. Kidney and liver function are stable.      If Jardiance is affordable and/or you are eligible for the [facility] patient pharmacy assistance program, I recommend we switch from Glimepiride to the Jardiance for the added cardiac and kidney benefits that Jardiance provides. If the Jardiance is not affordable and you are not eligible for PPA, you can continue the Glimepiride as you are, and well monitor your labs routinely.

## 2025-05-26 NOTE — TELEPHONE ENCOUNTER
----- Message from Genevieve Devries NP sent at 5/25/2025  9:49 PM CDT -----  Labs reviewed      A1c is improved at 6.6%. Kidney and liver function are stable.     If Jardiance is affordable and/or you are eligible for the [facility] patient pharmacy assistance program, I recommend we switch from Glimepiride to the Jardiance for the added cardiac and kidney   benefits that Jardiance provides. If the Jardiance is not affordable and you are not eligible for PPA, you can continue the Glimepiride as you are, and well monitor your labs routinely.     Let me know if any questions or concerns,     MARGIE Roche   ----- Message -----  From: Lab, Background User  Sent: 5/23/2025  11:38 PM CDT  To: Genevieve Devries NP

## 2025-05-29 ENCOUNTER — OFFICE VISIT (OUTPATIENT)
Dept: OPHTHALMOLOGY | Facility: CLINIC | Age: 70
End: 2025-05-29
Payer: MEDICARE

## 2025-05-29 DIAGNOSIS — H52.4 PRESBYOPIA: ICD-10-CM

## 2025-05-29 DIAGNOSIS — H52.202 MYOPIA OF LEFT EYE WITH ASTIGMATISM: ICD-10-CM

## 2025-05-29 DIAGNOSIS — H25.13 NUCLEAR SCLEROSIS, BILATERAL: ICD-10-CM

## 2025-05-29 DIAGNOSIS — E11.36 DIABETIC CATARACT OF BOTH EYES: ICD-10-CM

## 2025-05-29 DIAGNOSIS — E11.9 TYPE 2 DIABETES MELLITUS WITHOUT RETINOPATHY: Primary | ICD-10-CM

## 2025-05-29 DIAGNOSIS — H52.12 MYOPIA OF LEFT EYE WITH ASTIGMATISM: ICD-10-CM

## 2025-05-29 DIAGNOSIS — H52.221 REGULAR ASTIGMATISM OF RIGHT EYE: ICD-10-CM

## 2025-05-29 DIAGNOSIS — E11.9 TYPE 2 DIABETES MELLITUS WITHOUT COMPLICATION, WITHOUT LONG-TERM CURRENT USE OF INSULIN: ICD-10-CM

## 2025-05-29 PROCEDURE — 1159F MED LIST DOCD IN RCRD: CPT | Mod: CPTII,S$GLB,, | Performed by: OPTOMETRIST

## 2025-05-29 PROCEDURE — 3066F NEPHROPATHY DOC TX: CPT | Mod: CPTII,S$GLB,, | Performed by: OPTOMETRIST

## 2025-05-29 PROCEDURE — 92004 COMPRE OPH EXAM NEW PT 1/>: CPT | Mod: S$GLB,,, | Performed by: OPTOMETRIST

## 2025-05-29 PROCEDURE — 1160F RVW MEDS BY RX/DR IN RCRD: CPT | Mod: CPTII,S$GLB,, | Performed by: OPTOMETRIST

## 2025-05-29 PROCEDURE — 3061F NEG MICROALBUMINURIA REV: CPT | Mod: CPTII,S$GLB,, | Performed by: OPTOMETRIST

## 2025-05-29 PROCEDURE — 3044F HG A1C LEVEL LT 7.0%: CPT | Mod: CPTII,S$GLB,, | Performed by: OPTOMETRIST

## 2025-05-29 PROCEDURE — 2023F DILAT RTA XM W/O RTNOPTHY: CPT | Mod: CPTII,S$GLB,, | Performed by: OPTOMETRIST

## 2025-05-29 PROCEDURE — 4010F ACE/ARB THERAPY RXD/TAKEN: CPT | Mod: CPTII,S$GLB,, | Performed by: OPTOMETRIST

## 2025-05-29 PROCEDURE — 92015 DETERMINE REFRACTIVE STATE: CPT | Mod: S$GLB,,, | Performed by: OPTOMETRIST

## 2025-05-29 PROCEDURE — 99999 PR PBB SHADOW E&M-EST. PATIENT-LVL III: CPT | Mod: PBBFAC,,, | Performed by: OPTOMETRIST

## 2025-05-29 NOTE — PROGRESS NOTES
HPI     Diabetic Eye Exam            Comments: New to [facility]  Diagnosed with prediabetes in 2018  Vision changes since last eye exam?: reading    Any eye pain today: no    Other ocular symptoms: no    Interested in contact lens fitting today? no              Lab Results       Component                Value               Date                       HGBA1C                   6.6 (H)             05/23/2025                             Last edited by Roselyn Peres on 5/29/2025  2:06 PM.            Assessment /Plan     For exam results, see Encounter Report.    Type 2 diabetes mellitus without retinopathy  There was no diabetic retinopathy present in either eye today.   Recommended that pt continue care with PCP and/or specialists regarding diabetes.  Follow-up dilated eye exam recommended in 12 months, sooner with any vision changes or new concerns.    Nuclear sclerosis, bilateral  Diabetic cataract of both eyes  Cataracts not significantly affecting activities of daily living and therefore surgery is not indicated at this time.   Will continue to monitor over the next 12 months. Pt to call or RTC with any significant change in vision prior to next visit.     Myopia of left eye with astigmatism  Regular astigmatism of right eye  Presbyopia  Eyeglass Final Rx       Eyeglass Final Rx         Sphere Cylinder Axis Add    Right Arp +0.50 040 +2.50    Left -1.25 +0.75 085 +2.50      Expiration Date: 5/29/2026    Comments: SVL distance ok                    RTC 1 yr for dilated eye exam or PRN if any problems.   Discussed above and answered questions.

## 2025-06-02 ENCOUNTER — PATIENT MESSAGE (OUTPATIENT)
Dept: PHARMACY | Facility: CLINIC | Age: 70
End: 2025-06-02
Payer: MEDICARE

## 2025-06-05 ENCOUNTER — TELEPHONE (OUTPATIENT)
Dept: INTERNAL MEDICINE | Facility: CLINIC | Age: 70
End: 2025-06-05
Payer: MEDICARE

## 2025-06-06 ENCOUNTER — TELEPHONE (OUTPATIENT)
Dept: INTERNAL MEDICINE | Facility: CLINIC | Age: 70
End: 2025-06-06
Payer: MEDICARE

## 2025-06-06 ENCOUNTER — DOCUMENTATION ONLY (OUTPATIENT)
Dept: INTERNAL MEDICINE | Facility: CLINIC | Age: 70
End: 2025-06-06
Payer: MEDICARE

## 2025-06-09 ENCOUNTER — PATIENT MESSAGE (OUTPATIENT)
Dept: PHARMACY | Facility: CLINIC | Age: 70
End: 2025-06-09
Payer: MEDICARE

## 2025-06-10 ENCOUNTER — OFFICE VISIT (OUTPATIENT)
Dept: PULMONOLOGY | Facility: CLINIC | Age: 70
End: 2025-06-10
Payer: MEDICARE

## 2025-06-10 VITALS
HEART RATE: 96 BPM | SYSTOLIC BLOOD PRESSURE: 122 MMHG | BODY MASS INDEX: 21.79 KG/M2 | HEIGHT: 75 IN | OXYGEN SATURATION: 96 % | DIASTOLIC BLOOD PRESSURE: 66 MMHG | WEIGHT: 175.25 LBS

## 2025-06-10 DIAGNOSIS — R91.1 LEFT UPPER LOBE PULMONARY NODULE: Primary | ICD-10-CM

## 2025-06-10 DIAGNOSIS — F17.210 TOBACCO DEPENDENCE DUE TO CIGARETTES: ICD-10-CM

## 2025-06-10 DIAGNOSIS — F17.210 SMOKING GREATER THAN 40 PACK YEARS: ICD-10-CM

## 2025-06-10 DIAGNOSIS — J43.2 CENTRILOBULAR EMPHYSEMA: ICD-10-CM

## 2025-06-10 DIAGNOSIS — R93.89 ABNORMAL SCREENING CT OF CHEST: ICD-10-CM

## 2025-06-10 PROCEDURE — 3066F NEPHROPATHY DOC TX: CPT | Mod: CPTII,S$GLB,, | Performed by: INTERNAL MEDICINE

## 2025-06-10 PROCEDURE — 3074F SYST BP LT 130 MM HG: CPT | Mod: CPTII,S$GLB,, | Performed by: INTERNAL MEDICINE

## 2025-06-10 PROCEDURE — 99999 PR PBB SHADOW E&M-EST. PATIENT-LVL V: CPT | Mod: PBBFAC,,, | Performed by: INTERNAL MEDICINE

## 2025-06-10 PROCEDURE — 4010F ACE/ARB THERAPY RXD/TAKEN: CPT | Mod: CPTII,S$GLB,, | Performed by: INTERNAL MEDICINE

## 2025-06-10 PROCEDURE — 3061F NEG MICROALBUMINURIA REV: CPT | Mod: CPTII,S$GLB,, | Performed by: INTERNAL MEDICINE

## 2025-06-10 PROCEDURE — 1101F PT FALLS ASSESS-DOCD LE1/YR: CPT | Mod: CPTII,S$GLB,, | Performed by: INTERNAL MEDICINE

## 2025-06-10 PROCEDURE — 1125F AMNT PAIN NOTED PAIN PRSNT: CPT | Mod: CPTII,S$GLB,, | Performed by: INTERNAL MEDICINE

## 2025-06-10 PROCEDURE — 3078F DIAST BP <80 MM HG: CPT | Mod: CPTII,S$GLB,, | Performed by: INTERNAL MEDICINE

## 2025-06-10 PROCEDURE — 99204 OFFICE O/P NEW MOD 45 MIN: CPT | Mod: S$GLB,,, | Performed by: INTERNAL MEDICINE

## 2025-06-10 PROCEDURE — 3008F BODY MASS INDEX DOCD: CPT | Mod: CPTII,S$GLB,, | Performed by: INTERNAL MEDICINE

## 2025-06-10 PROCEDURE — 3044F HG A1C LEVEL LT 7.0%: CPT | Mod: CPTII,S$GLB,, | Performed by: INTERNAL MEDICINE

## 2025-06-10 PROCEDURE — 1160F RVW MEDS BY RX/DR IN RCRD: CPT | Mod: CPTII,S$GLB,, | Performed by: INTERNAL MEDICINE

## 2025-06-10 PROCEDURE — 3288F FALL RISK ASSESSMENT DOCD: CPT | Mod: CPTII,S$GLB,, | Performed by: INTERNAL MEDICINE

## 2025-06-10 PROCEDURE — 1159F MED LIST DOCD IN RCRD: CPT | Mod: CPTII,S$GLB,, | Performed by: INTERNAL MEDICINE

## 2025-06-10 RX ORDER — AZITHROMYCIN 250 MG/1
TABLET, FILM COATED ORAL
Qty: 6 TABLET | Refills: 0 | Status: SHIPPED | OUTPATIENT
Start: 2025-06-10

## 2025-06-10 RX ORDER — ALBUTEROL SULFATE 90 UG/1
2 INHALANT RESPIRATORY (INHALATION) EVERY 6 HOURS PRN
Qty: 18 G | Refills: 5 | Status: SHIPPED | OUTPATIENT
Start: 2025-06-10

## 2025-06-10 RX ORDER — IBUPROFEN 200 MG
1 TABLET ORAL DAILY
Qty: 28 PATCH | Refills: 2 | Status: SHIPPED | OUTPATIENT
Start: 2025-06-10

## 2025-06-10 NOTE — PROGRESS NOTES
"                                       Initial Outpatient Pulmonary Evaluation       SUBJECTIVE:     Chief Complaint   Patient presents with    New Patient Mass       History of Present Illness:    Patient is a 69 y.o. male     History of Present Illness    CHIEF COMPLAINT:  Patient presents today for evaluation of a lung nodule.    LUNG NODULE:  CT in April showed a left upper lobe nodule measuring 4.8mm, compared to 5mm on CT from 2021.    RESPIRATORY:  He experiences shortness of breath with walking. He denies coughing and wheezing.    SOCIAL HISTORY:  He started smoking at age 18 and currently smokes one pack per day.    FAMILY HISTORY:  His father had cancer.           Review of Systems   Constitutional:  Negative for fever.   Respiratory:  Negative for dyspnea on extertion.        Review of patient's allergies indicates:  No Known Allergies    Current Medications[1]    Past Medical History:   Diagnosis Date    Hypertension     Type 2 diabetes mellitus without complications      Past Surgical History:   Procedure Laterality Date    CAROTID STENT      EXAMINATION UNDER ANESTHESIA Left 12/12/2023    Procedure: Exam under anesthesia;  Surgeon: Scout Lainez MD;  Location: [facility] OR;  Service: General;  Laterality: Left;    INCISION AND DRAINAGE OF PERIRECTAL REGION N/A 12/12/2023    Procedure: INCISION AND DRAINAGE, PERIRECTAL REGION;  Surgeon: Scout Lainez MD;  Location: [facility] OR;  Service: General;  Laterality: N/A;  abcess     Family History   Problem Relation Name Age of Onset    Diabetes Mother      Cancer Father      Prostate cancer Father      Breast cancer Neg Hx       Social History[2]       OBJECTIVE:     Vital Signs (Most Recent)  Vital Signs  Pulse: 96  SpO2: 96 %  BP: 122/66  Pain Score:   6  Pain Loc: Back  Height and Weight  Height: 6' 3" (190.5 cm)  Weight: 79.5 kg (175 lb 4.3 oz)  BSA (Calculated - sq m): 2.05 sq meters  BMI (Calculated): 21.9  Weight in (lb) to have BMI = 25: 199.6]  Wt Readings " "from Last 2 Encounters:   06/10/25 79.5 kg (175 lb 4.3 oz)   05/23/25 81.5 kg (179 lb 10.8 oz)         Physical Exam:  Physical Exam   Constitutional: He is oriented to person, place, and time. He appears well-developed. No distress.   Pulmonary/Chest: Normal expansion, hyperinflation and effort normal. No stridor. No respiratory distress. He has decreased breath sounds. He has no wheezes. He has no rhonchi.   Neurological: He is alert and oriented to person, place, and time.   Psychiatric: He has a normal mood and affect. His behavior is normal. Judgment and thought content normal.   Nursing note and vitals reviewed.      Laboratory  Lab Results   Component Value Date    WBC 14.86 (H) 12/11/2023    RBC 4.38 (L) 12/11/2023    HGB 14.0 12/11/2023    HCT 42.5 12/11/2023    MCV 97 12/11/2023    MCH 32.0 (H) 12/11/2023    MCHC 32.9 12/11/2023    RDW 12.6 12/11/2023     12/11/2023    MPV 9.1 (L) 12/11/2023    GRAN 10.9 (H) 12/11/2023    GRAN 73.4 (H) 12/11/2023    LYMPH 2.7 12/11/2023    LYMPH 18.0 12/11/2023    MONO 1.1 (H) 12/11/2023    MONO 7.5 12/11/2023    EOS 0.0 12/11/2023    BASO 0.04 12/11/2023    EOSINOPHIL 0.3 12/11/2023    BASOPHIL 0.3 12/11/2023       BMP  Lab Results   Component Value Date     05/23/2025    K 4.6 05/23/2025     05/23/2025    CO2 22 (L) 05/23/2025    BUN 12 05/23/2025    CREATININE 1.1 05/23/2025    CALCIUM 9.3 05/23/2025    ANIONGAP 11 05/23/2025    AST 17 05/23/2025    ALT 11 05/23/2025    PROT 7.7 05/23/2025       No results found for: "BNP"    Lab Results   Component Value Date    TSH 1.238 11/18/2022       No results found for: "SEDRATE"    No results found for: "CRP"    No results found for: "IGE"    No results found for: "ASPERGILLUS"  No results found for: "AFUMIGATUSCL"     No results found for: "ACE"    Diagnostic Results:  I have personally reviewed today the following studies :    AS ABOVE    ASSESSMENT/PLAN:   Assessment & Plan    J43.2 Centrilobular " emphysema  R93.89 Abnormal screening CT of chest  R91.1 Left upper lobe pulmonary nodule  F17.210 Tobacco dependence due to cigarettes    IMPRESSION:  - Identified 9 mm nodule in left upper lobe, concerning for malignancy given smoking history and growth from 5 mm in 2021.  - Ordered PET scan to evaluate for cancer activity; if positive, will proceed with biopsy.  - Considered surgical resection or ablation if cancer is localized.  - Noted significant emphysema on CT, requiring pulmonary function testing to assess surgical candidacy and guide treatment.  - Started Z-Christin (azithromycin) for 5 days to rule out infectious etiology of nodule.    CENTRILOBULAR EMPHYSEMA:  - Started inhaler to use as needed for coughing, wheezing, or shortness of breath.  - Explained importance of lung function testing in determining treatment options.  - Ordered pulmonary function tests.    ABNORMAL SCREENING CT OF CHEST:  - Explained anatomy on CT, pointing out lungs, windpipe, and spine.    LEFT UPPER LOBE PULMONARY NODULE:  - Described PET scan process, including tracer injection to detect cancer activity.  - Discussed potential treatment options for early-stage lung cancer, including surgery and ablation.  - Started Z-Christin (azithromycin) for 5 days to rule out infectious etiology of nodule.  - Ordered PET scan to evaluate for cancer activity; if positive, will proceed with biopsy.  - Follow up in 1 month.  - Complete scheduled tests before next appointment.    TOBACCO DEPENDENCE DUE TO CIGARETTES:  - Patient to quit smoking.  - Started nicotine patches and Chantix for smoking cessation; warned about potential Chantix side effects including nightmares and suicidal thoughts.         Left upper lobe pulmonary nodule  -     NM PET CT FDG Skull Base to Mid Thigh; Future; Expected date: 06/10/2025  -     azithromycin (Z-CHRISTIN) 250 MG tablet; Take 2 tablets by mouth on day 1; Take 1 tablet by mouth on days 2-5  Dispense: 6 tablet; Refill:  0    Abnormal screening CT of chest  -     Ambulatory referral/consult to Pulmonology  -     NM PET CT FDG Skull Base to Mid Thigh; Future; Expected date: 06/10/2025    Centrilobular emphysema  -     Complete PFT with bronchodilator; Future  -     Stress test, pulmonary; Future  -     albuterol (PROVENTIL/VENTOLIN HFA) 90 mcg/actuation inhaler; Inhale 2 puffs into the lungs every 6 (six) hours as needed for Wheezing or Shortness of Breath (cough). Rescue  Dispense: 18 g; Refill: 5    Tobacco dependence due to cigarettes  -     nicotine (NICODERM CQ) 21 mg/24 hr; Place 1 patch onto the skin once daily.  Dispense: 28 patch; Refill: 2    Smoking greater than 40 pack years  -     nicotine (NICODERM CQ) 21 mg/24 hr; Place 1 patch onto the skin once daily.  Dispense: 28 patch; Refill: 2          Follow up in about 1 month (around 7/10/2025).    This note was prepared using voice recognition system and is likely to have sound alike errors that may have been overlooked even after proof reading.  Please call me with any questions    Discussed diagnosis, its evaluation, treatment and usual course. All questions answered.    Thank you for the courtesy of participating in the care of this patient    Pierre Shelby MD             [1]   Current Outpatient Medications   Medication Sig Dispense Refill    aspirin (ECOTRIN) 81 MG EC tablet Take 81 mg by mouth once daily.      atorvastatin (LIPITOR) 40 MG tablet Take 1 tablet (40 mg total) by mouth every evening. 90 tablet 1    blood sugar diagnostic Strp To check BG 3 times daily, to use with insurance preferred meter 100 each 11    blood-glucose meter kit To check BG 3 times daily, to use with insurance preferred meter 1 each 0    clopidogreL (PLAVIX) 75 mg tablet Take 75 mg by mouth once daily.      empagliflozin (JARDIANCE) 10 mg tablet Take 1 tablet (10 mg total) by mouth once daily. 30 tablet 2    glimepiride (AMARYL) 2 MG tablet Take 1 tablet (2 mg total) by mouth once  daily. 1300 90 tablet 1    irbesartan (AVAPRO) 300 MG tablet Take 1 tablet (300 mg total) by mouth once daily. 90 tablet 1    lancets Misc To check BG 3 times daily, to use with insurance preferred meter 100 each 11    metoprolol succinate (TOPROL-XL) 50 MG 24 hr tablet Take 1 tablet (50 mg total) by mouth once daily. 90 tablet 1    tamsulosin (FLOMAX) 0.4 mg Cap Take 1 capsule (0.4 mg total) by mouth once daily. 30 capsule 11    albuterol (PROVENTIL/VENTOLIN HFA) 90 mcg/actuation inhaler Inhale 2 puffs into the lungs every 6 (six) hours as needed for Wheezing or Shortness of Breath (cough). Rescue 18 g 5    azithromycin (Z-CHRISTIN) 250 MG tablet Take 2 tablets by mouth on day 1; Take 1 tablet by mouth on days 2-5 6 tablet 0    nicotine (NICODERM CQ) 21 mg/24 hr Place 1 patch onto the skin once daily. 28 patch 2     No current facility-administered medications for this visit.   [2]   Social History  Tobacco Use    Smoking status: Every Day     Current packs/day: 1.00     Average packs/day: 0.8 packs/day for 60.3 years (45.3 ttl pk-yrs)     Types: Cigarettes     Start date: 2/20/1995    Smokeless tobacco: Never    Tobacco comments:     HOLD MIDNIGHT PRIOR   Substance Use Topics    Alcohol use: Yes     Alcohol/week: 3.0 standard drinks of alcohol     Types: 3 Cans of beer per week     Comment: HOLD NOW PRIOR    Drug use: Never

## 2025-06-12 ENCOUNTER — HOSPITAL ENCOUNTER (OUTPATIENT)
Dept: RADIOLOGY | Facility: HOSPITAL | Age: 70
Discharge: HOME | End: 2025-06-12
Attending: UROLOGY
Payer: COMMERCIAL

## 2025-06-12 DIAGNOSIS — Z01.818 PRE-OP TESTING: ICD-10-CM

## 2025-06-12 PROCEDURE — 71046 X-RAY EXAM CHEST 2 VIEWS: CPT | Mod: 26,,, | Performed by: RADIOLOGY

## 2025-06-12 PROCEDURE — 71046 X-RAY EXAM CHEST 2 VIEWS: CPT | Mod: TC,PN

## 2025-06-20 ENCOUNTER — TELEPHONE (OUTPATIENT)
Dept: UROLOGY | Facility: CLINIC | Age: 70
End: 2025-06-20
Payer: MEDICARE

## 2025-06-20 ENCOUNTER — HOSPITAL ENCOUNTER (OUTPATIENT)
Dept: RADIOLOGY | Facility: HOSPITAL | Age: 70
Discharge: HOME | End: 2025-06-20
Attending: FAMILY MEDICINE
Payer: MEDICARE

## 2025-06-20 ENCOUNTER — OFFICE VISIT (OUTPATIENT)
Dept: INTERNAL MEDICINE | Facility: CLINIC | Age: 70
End: 2025-06-20
Payer: COMMERCIAL

## 2025-06-20 ENCOUNTER — PATIENT MESSAGE (OUTPATIENT)
Dept: UROLOGY | Facility: CLINIC | Age: 70
End: 2025-06-20
Payer: COMMERCIAL

## 2025-06-20 ENCOUNTER — CLINICAL SUPPORT (OUTPATIENT)
Dept: PULMONOLOGY | Facility: CLINIC | Age: 70
End: 2025-06-20
Payer: MEDICARE

## 2025-06-20 VITALS
BODY MASS INDEX: 22.09 KG/M2 | DIASTOLIC BLOOD PRESSURE: 62 MMHG | HEART RATE: 89 BPM | HEIGHT: 75 IN | OXYGEN SATURATION: 97 % | WEIGHT: 177.69 LBS | RESPIRATION RATE: 18 BRPM | SYSTOLIC BLOOD PRESSURE: 128 MMHG

## 2025-06-20 DIAGNOSIS — R97.20 ELEVATED PSA: Primary | ICD-10-CM

## 2025-06-20 DIAGNOSIS — R97.20 ELEVATED PSA, BETWEEN 10 AND LESS THAN 20 NG/ML: ICD-10-CM

## 2025-06-20 DIAGNOSIS — I25.10 CORONARY ARTERY DISEASE INVOLVING NATIVE CORONARY ARTERY OF NATIVE HEART WITHOUT ANGINA PECTORIS: ICD-10-CM

## 2025-06-20 DIAGNOSIS — E11.9 TYPE 2 DIABETES MELLITUS WITHOUT COMPLICATION, WITHOUT LONG-TERM CURRENT USE OF INSULIN: ICD-10-CM

## 2025-06-20 DIAGNOSIS — I15.2 HYPERTENSION ASSOCIATED WITH DIABETES: Primary | ICD-10-CM

## 2025-06-20 DIAGNOSIS — N39.0 URINARY TRACT INFECTION WITHOUT HEMATURIA, SITE UNSPECIFIED: ICD-10-CM

## 2025-06-20 DIAGNOSIS — E11.69 HYPERLIPIDEMIA ASSOCIATED WITH TYPE 2 DIABETES MELLITUS: ICD-10-CM

## 2025-06-20 DIAGNOSIS — M54.32 LEFT SIDED SCIATICA: ICD-10-CM

## 2025-06-20 DIAGNOSIS — I50.22 CHRONIC SYSTOLIC (CONGESTIVE) HEART FAILURE: ICD-10-CM

## 2025-06-20 DIAGNOSIS — Z72.0 TOBACCO USE: ICD-10-CM

## 2025-06-20 DIAGNOSIS — E78.5 HYPERLIPIDEMIA ASSOCIATED WITH TYPE 2 DIABETES MELLITUS: ICD-10-CM

## 2025-06-20 DIAGNOSIS — J43.2 CENTRILOBULAR EMPHYSEMA: ICD-10-CM

## 2025-06-20 DIAGNOSIS — Z01.818 PRE-OP TESTING: ICD-10-CM

## 2025-06-20 DIAGNOSIS — E11.59 HYPERTENSION ASSOCIATED WITH DIABETES: Primary | ICD-10-CM

## 2025-06-20 LAB
BRPFT: NORMAL
DLCO ADJ PRE: 11.04 ML/(MIN*MMHG)
DLCO SINGLE BREATH LLN: 25.23
DLCO SINGLE BREATH PRE REF: 34.3 %
DLCO SINGLE BREATH REF: 32.15
DLCOC SBVA LLN: 2.92
DLCOC SBVA PRE REF: 43 %
DLCOC SBVA REF: 3.93
DLCOC SINGLE BREATH LLN: 25.23
DLCOC SINGLE BREATH PRE REF: 34.3 %
DLCOC SINGLE BREATH REF: 32.15
DLCOVA LLN: 2.92
DLCOVA PRE REF: 43 %
DLCOVA PRE: 1.69 ML/(MIN*MMHG*L)
DLCOVA REF: 3.93
DLVAADJ PRE: 1.69 ML/(MIN*MMHG*L)
ERV LLN: -16448.76
ERV PRE REF: 160.6 %
ERV REF: 1.24
FEF 25 75 CHG: -4 %
FEF 25 75 LLN: 1.81
FEF 25 75 POST REF: 76.1 %
FEF 25 75 PRE REF: 79.3 %
FEF 25 75 REF: 3.52
FET100 CHG: -7.9 %
FEV1 CHG: 1.2 %
FEV1 FVC CHG: 1.2 %
FEV1 FVC LLN: 63
FEV1 FVC POST REF: 114.8 %
FEV1 FVC PRE REF: 113.4 %
FEV1 FVC REF: 76
FEV1 LLN: 2.29
FEV1 POST REF: 78.5 %
FEV1 PRE REF: 77.6 %
FEV1 REF: 3.29
FRCPLETH LLN: 3
FRCPLETH PREREF: 131.2 %
FRCPLETH REF: 3.98
FVC CHG: -0.1 %
FVC LLN: 3.16
FVC POST REF: 68.1 %
FVC PRE REF: 68.1 %
FVC REF: 4.35
IVC PRE: 2.42 L
IVC SINGLE BREATH LLN: 3.16
IVC SINGLE BREATH PRE REF: 55.7 %
IVC SINGLE BREATH REF: 4.35
MVV LLN: 129
MVV PRE REF: 55.6 %
MVV REF: 151
PEF CHG: 4.4 %
PEF LLN: 6.4
PEF POST REF: 71.5 %
PEF PRE REF: 68.5 %
PEF REF: 9.46
POST FEF 25 75: 2.68 L/S
POST FET 100: 2.15 SEC
POST FEV1 FVC: 87.26 %
POST FEV1: 2.58 L
POST FVC: 2.96 L
POST PEF: 6.77 L/S
PRE DLCO: 11.04 ML/(MIN*MMHG)
PRE ERV: 1.99 L
PRE FEF 25 75: 2.79 L/S
PRE FET 100: 2.34 SEC
PRE FEV1 FVC: 86.19 %
PRE FEV1: 2.55 L
PRE FRC PL: 5.22 L
PRE FVC: 2.96 L
PRE MVV: 84 L/MIN
PRE PEF: 6.48 L/S
PRE RV: 3.17 L
PRE TLC: 7.25 L
RAW LLN: 3.06
RAW PRE REF: 47.7 %
RAW PRE: 1.46 CMH2O*S/L
RAW REF: 3.06
RV LLN: 2.07
RV PRE REF: 115.6 %
RV REF: 2.75
RVTLC LLN: 31
RVTLC PRE REF: 109.3 %
RVTLC PRE: 43.82 %
RVTLC REF: 40
TLC LLN: 7.03
TLC PRE REF: 88.6 %
TLC REF: 8.18
VA PRE: 6.53 L
VA SINGLE BREATH LLN: 8.03
VA SINGLE BREATH PRE REF: 81.3 %
VA SINGLE BREATH REF: 8.03
VC LLN: 3.16
VC PRE REF: 93.6 %
VC PRE: 4.07 L
VC REF: 4.35
VTGRAWPRE: 5.04 L

## 2025-06-20 PROCEDURE — 99999 PR PBB SHADOW E&M-EST. PATIENT-LVL IV: CPT | Mod: PBBFAC,,, | Performed by: FAMILY MEDICINE

## 2025-06-20 PROCEDURE — 72110 X-RAY EXAM L-2 SPINE 4/>VWS: CPT | Mod: TC

## 2025-06-20 PROCEDURE — 72110 X-RAY EXAM L-2 SPINE 4/>VWS: CPT | Mod: 26,,, | Performed by: RADIOLOGY

## 2025-06-20 RX ORDER — METHOCARBAMOL 750 MG/1
750 TABLET, FILM COATED ORAL NIGHTLY PRN
Qty: 30 TABLET | Refills: 0 | Status: SHIPPED | OUTPATIENT
Start: 2025-06-20

## 2025-06-20 NOTE — PROGRESS NOTES
Subjective:       Patient ID: Nieves Torres is a 69 y.o. male presents with Problem List[1]     Chief Complaint: Follow-up (3 Month)    History of Present Illness    Nieves presents today for back pain. He reports chronic back pain rated as 7/10 in severity that radiates down the left leg. Pain is most intense in the morning with significant stiffness, requiring effort to walk initially. Symptoms improve with movement throughout the day. He denies any specific trauma or injury as precipitating cause and has not attempted any pain medication management. He recently saw a lung specialist who identified a spot on his lung for which she was prescribed antibiotics. He denies any current breathing difficulties or respiratory symptoms. He reports frequent urination and is currently taking medication for urinary symptoms. He is scheduled for a procedure next week, likely addressing an enlarged prostate. His glucose was recently 190. A1C has improved from 9.0 to 6.6 over the past four months, indicating better glycemic control.  Patient is scheduled to get prostate biopsy for elevated PSA levels      ROS:  General: -fever, -chills, -fatigue, -weight gain, -weight loss, -loss of appetite  Eyes: -vision changes, -blurry vision, -eye pain, -eye discharge  ENT: -ear pain, -hearing loss, -tinnitus, -nasal congestion, -sore throat  Cardiovascular: -chest pain, -palpitations, -lower extremity edema  Respiratory: -cough, -shortness of breath, -wheezing, -sputum production  Endocrine: +polyuria, -polydipsia, -heat intolerance, -cold intolerance  Gastrointestinal: -abdominal pain, -heartburn, -nausea, -vomiting, -diarrhea, -constipation, -blood in stool  Genitourinary: -dysuria, -urgency, -frequency, -hematuria, -nocturia, -incontinence  Heme & Lymphatic: -easy or excessive bleeding, -easy bruising, -swollen lymph nodes  Musculoskeletal: -muscle pain, +back pain, -joint pain, -joint swelling, +difficulty standing up, +pain with  "movement, +muscle stiffness, +joint stiffness  Skin: -rash, -lesion, -itching, -skin texture changes, -skin color changes  Neurological: -headache, -dizziness, -numbness, -tingling, -seizure activity, -speech difficulty, -memory loss, -confusion  Psychiatric: -anxiety, -depression, -sleep difficulty                /62 (BP Location: Left arm, Patient Position: Sitting)   Pulse 89   Resp 18   Ht 6' 3" (1.905 m)   Wt 80.6 kg (177 lb 11.1 oz)   SpO2 97%   BMI 22.21 kg/m²   Objective:      Physical Exam  Constitutional:       Appearance: He is well-developed.   HENT:      Head: Normocephalic and atraumatic.   Cardiovascular:      Rate and Rhythm: Normal rate and regular rhythm.      Heart sounds: Normal heart sounds. No murmur heard.  Pulmonary:      Effort: Pulmonary effort is normal.      Breath sounds: Normal breath sounds. No wheezing.   Abdominal:      General: Bowel sounds are normal.      Palpations: Abdomen is soft.      Tenderness: There is no abdominal tenderness.   Musculoskeletal:         General: Tenderness present.      Comments: Positive for paraspinal lumbar muscle tenderness on the left side  Straight leg raise test negative bilaterally   Skin:     General: Skin is warm and dry.      Findings: No rash.   Neurological:      Mental Status: He is alert and oriented to person, place, and time.   Psychiatric:         Mood and Affect: Mood normal.           Assessment/Plan:   1. Hypertension associated with diabetes  Blood pressure is stable currently on Avapro 300 mg daily and metoprolol 50 mg daily    2. Hyperlipidemia associated with type 2 diabetes mellitus  Currently taking Lipitor 40 mg daily    3. Type 2 diabetes mellitus without complication, without long-term current use of insulin  Stable on glimepiride 2 mg Jardiance 10 mg daily  Strict lifestyle changes recommended with 1800 ADA low-fat and low-cholesterol diet and exercise 30 minutes daily      4. Coronary artery disease involving " native coronary artery of native heart without angina pectoris  Overview:  Dr Demond Villanueva , cards  5. Chronic systolic (congestive) heart failure  Followed by Cardiology clinically doing well on aspirin statin Plavix Avapro and metoprolol    6. Tobacco use  Encouraged to quit smoking    7. Elevated PSA, between 10 and less than 20 ng/ml  Secondary to elevated PSA levels patient is scheduled to get prostate biopsy for further evaluation by urology soon    8. Left sided sciatica  -     X-Ray Lumbar Spine 5 View; Future; Expected date: 06/20/2025  -     methocarbamoL (ROBAXIN) 750 MG Tab; Take 1 tablet (750 mg total) by mouth nightly as needed (MUSCLE SPASM).  Dispense: 30 tablet; Refill: 0  Methocarbamol prescribed today for symptomatic relief and has been taking over-the-counter NSAIDs with some relief  Patient was advised to be cautious with muscle relaxants being sedated  Will get x-ray of the lumbar spine for further evaluation  Warm compresses recommended and graded exercise regimen recommended    Visit today included increased complexity associated with the care of the episodic problem  addressed and managing the longitudinal care of the patient due to the serious and/or complex managed problem(s) .            This note was generated with the assistance of ambient listening technology. Verbal consent was obtained by the patient and accompanying visitor(s) for the recording of patient appointment to facilitate this note. I attest to having reviewed and edited the generated note for accuracy, though some syntax or spelling errors may persist. Please contact the author of this note for any clarification.            [1]   Patient Active Problem List  Diagnosis    Hypertension associated with diabetes    Hyperlipidemia associated with type 2 diabetes mellitus    Type 2 diabetes mellitus without complication, without long-term current use of insulin    Coronary artery disease involving native coronary artery of  native heart without angina pectoris    Lipoma of back    Chronic systolic (congestive) heart failure    Tobacco use    Elevated PSA, between 10 and less than 20 ng/ml

## 2025-06-20 NOTE — TELEPHONE ENCOUNTER
.Outgoing call attempted to notify patient regarding his needed pre-op testing but no answer, left voice message with contact information letting patient know he can contact us at his earliest convenience for assist and a MyChart message sent as well.

## 2025-06-21 ENCOUNTER — RESULTS FOLLOW-UP (OUTPATIENT)
Dept: INTERNAL MEDICINE | Facility: CLINIC | Age: 70
End: 2025-06-21

## 2025-06-21 ENCOUNTER — RESULTS FOLLOW-UP (OUTPATIENT)
Dept: SLEEP MEDICINE | Facility: HOSPITAL | Age: 70
End: 2025-06-21

## 2025-06-23 NOTE — PRE-PROCEDURE INSTRUCTIONS
Per Dr. Camacho, patient may proceed with surgery on 6/26/2025 if he is feeling well with daily activities without experiencing SOB.     Viola Aguilar RN called patient and inquired about patient's wellbeing and any issues with SOB. Patient stated he was feeling fine and has no issues at all with SOB. Please see her note dated 6/23/25 for more information.

## 2025-06-23 NOTE — PRE-PROCEDURE INSTRUCTIONS
Called pt and inquired as to how he is currently feeling and if you could walk the length of a long parking lot without feeling SOB. Patient stated he was feeling well and was not having any SOB upon exertion.

## 2025-06-23 NOTE — PRE-PROCEDURE INSTRUCTIONS
Pre op instructions reviewed with PATIENT over telephone, verbalized understanding.    Patient stated he was at work. Patient stated all medications and medical history listed in MyChart are correct. Patient uncooperative and unwilling to thoroughly complete the call.  Patient appeared very rushed and in a hurry to get off phone.     Procedure Date: THURSDAY 6/26/25  Arrival Time: We will call you after 2pm the day before your procedure with your arrival time.    Address:   [facility] (Off [facility], [address] [place] [address] on the left)  [address] [place] [address] [place]. [address]  >>>Please enter through front entrance Lobby of 1st floor to Registration desk<<<      !!!INSTRUCTIONS IMPORTANT!!!  Do not eat or drink after 12 midnight, Do not smoke or use chewing tobacco after 12 midnight!  OK to brush teeth, but no gum, candy, or mints!       NO ALCOHOL consumption beginning today! MUST HOLD ALCOHOL THREE DAYS BEFORE YOUR SURGERY!    NO TOBACCO PRODUCTS, INCLUDING NICOTINE PATCH, AFTER MIDNIGHT THE NIGHT BEFORE SURGERY.       >>>MEDICATION INSTRUCTIONS<<<: Morning of Surgery, take small sip of water with ONLY these medications:  Metoprolol-if you normally take this in the mornings  Albuterol  Flomax    Additional Instructions: Take Jardiance and Glimepiride the morning prior to surgery day (Wednesday 6-25-25). DO NOT take morning of surgery!    *Blood Thinners: Please take last dose of Aspirin and Plavix today as per instructions you stated you received from Dr. Bajwa prior to surgery! Call your Surgeon office to inquire about any questions regarding your blood thinner medication.    *ADHD Medication: Stop taking ADHD/ADD medications 48 hrs prior to surgery, as this can affect the anesthesia used.     *Diabetic/ Prediabetic Patients: !!!If you take diabetic or weight loss medication, Do NOT take morning of surgery unless instructed by Doctor!!!  Metformin to be stopped 24 hrs prior to surgery.   Long Acting  Insulin Instructions: HOLD the night before surgery unless instructed differently by Provider!  Ozempic/ Mounjaro/ Wegovy/ Trulicity/ Semaglutide injections or weight loss medication to be stopped 7 days prior to surgery.    If you take Ozempic/ Mounjaro / Wegovy / Trulicity / Semaglutide, Tirzepatide any weight loss injections OR PHENTERMINE --->>> PLEASE LET US KNOW IMMEDIATELY, as these medications need to be stopped 7 days prior to surgery!      !!!STOP ALL Aspirins, NSAIDS, WEIGHT LOSS INJECTIONS/PILLS, Herbal supplements, & Vitamins 7 DAYS BEFORE SURGERY!!!    ____  Avoid Alcoholic beverages 3 days prior to surgery, as it can thin the blood.  ____  NO Acrylic/fake nails or nail polish worn day of surgery (specifically hand/arm & foot surgeries).  ____  NO powder, lotions, deodorants, oils or cream on body.  ____  Remove all jewelry & piercings & foreign objects before arrival & leave at home.  ____  Remove Dentures, Hearing Aids & Contact Lens prior to surgery.  ____  Bring photo ID and insurance information to hospital (Leave Valuables at Home).  ____  If going home the same day, arrange for a ride home. You will not be able to drive for 24 hrs if Anesthesia was used.   ____  Females (ages 11-60): may need to give a urine sample the morning of surgery; please see Pre op Nurse prior to using the restroom.  ____  Males: Stop ED medications (Viagra, Cialis) 24 hrs prior to surgery.  ____  Wear clean, loose fitting clothing to allow for dressings/ bandages.      Bathing Instructions:    -Shower with anti-bacterial Soap (Hibiclens or Dial) the night before surgery and the morning of.   -Do not use Hibiclens on your face or genitals.   -Apply clean clothes after shower.  -Do not shave your face or body 2 days prior to surgery unless instructed otherwise by your Surgeon.  -Do not shave your pubic area 7 days prior to surgery (GYN PATIENTS)    [facility] Vis[facility]/Ride Policy:  Only 2 adults allowed in pre op/recovery  area during your procedure. You MUST HAVE A RIDE HOME from a responsible adult that you know and trust. Medical Transport, Uber or Lyft can ONLY be used if patient has a responsible adult to accompany them during ride home.       *Signs and symptoms of Infection Before or After Surgery:               !!!If you experience any fever, chills, nausea/ vomiting, foul odor/ excessive drainage from surgical site, flu-like symptoms, new wounds or cuts, PLEASE CALL THE SURGEON OFFICE at [phone] or SEND MESSAGE THROUGH [employer]!!!     *If you are running late the morning of surgery, please call the [facility] Surgery Dept @ [phone].     *Billing questions:  [phone]     Thank you,  -[facility] Pre Admit Dept.  [phone] or [phone]  M-F 7:30 am-4:00 pm (Closed Major Holidays)

## 2025-06-24 ENCOUNTER — TELEPHONE (OUTPATIENT)
Dept: UROLOGY | Facility: CLINIC | Age: 70
End: 2025-06-24
Payer: COMMERCIAL

## 2025-06-24 ENCOUNTER — LAB VISIT (OUTPATIENT)
Dept: LAB | Facility: HOSPITAL | Age: 70
End: 2025-06-24
Attending: UROLOGY
Payer: COMMERCIAL

## 2025-06-24 DIAGNOSIS — R97.20 ELEVATED PSA: ICD-10-CM

## 2025-06-24 DIAGNOSIS — N39.0 URINARY TRACT INFECTION WITHOUT HEMATURIA, SITE UNSPECIFIED: ICD-10-CM

## 2025-06-24 DIAGNOSIS — Z01.818 PRE-OP TESTING: ICD-10-CM

## 2025-06-24 LAB
ABSOLUTE EOSINOPHIL (OHS): 0.12 K/UL
ABSOLUTE MONOCYTE (OHS): 0.71 K/UL (ref 0.3–1)
ABSOLUTE NEUTROPHIL COUNT (OHS): 5.06 K/UL (ref 1.8–7.7)
BASOPHILS # BLD AUTO: 0.03 K/UL
BASOPHILS NFR BLD AUTO: 0.4 %
BILIRUB UR QL STRIP.AUTO: NEGATIVE
CLARITY UR: CLEAR
COLOR UR AUTO: YELLOW
ERYTHROCYTE [DISTWIDTH] IN BLOOD BY AUTOMATED COUNT: 13.2 % (ref 11.5–14.5)
GLUCOSE UR QL STRIP: ABNORMAL
HCT VFR BLD AUTO: 35.9 % (ref 40–54)
HGB BLD-MCNC: 11.8 GM/DL (ref 14–18)
HGB UR QL STRIP: NEGATIVE
IMM GRANULOCYTES # BLD AUTO: 0.07 K/UL (ref 0–0.04)
IMM GRANULOCYTES NFR BLD AUTO: 0.8 % (ref 0–0.5)
KETONES UR QL STRIP: NEGATIVE
LEUKOCYTE ESTERASE UR QL STRIP: NEGATIVE
LYMPHOCYTES # BLD AUTO: 2.51 K/UL (ref 1–4.8)
MCH RBC QN AUTO: 31.6 PG (ref 27–31)
MCHC RBC AUTO-ENTMCNC: 32.9 G/DL (ref 32–36)
MCV RBC AUTO: 96 FL (ref 82–98)
NITRITE UR QL STRIP: NEGATIVE
NUCLEATED RBC (/100WBC) (OHS): 0 /100 WBC
PH UR STRIP: 6 [PH]
PLATELET # BLD AUTO: 312 K/UL (ref 150–450)
PMV BLD AUTO: 9.5 FL (ref 9.2–12.9)
PROT UR QL STRIP: NEGATIVE
RBC # BLD AUTO: 3.74 M/UL (ref 4.6–6.2)
RELATIVE EOSINOPHIL (OHS): 1.4 %
RELATIVE LYMPHOCYTE (OHS): 29.5 % (ref 18–48)
RELATIVE MONOCYTE (OHS): 8.4 % (ref 4–15)
RELATIVE NEUTROPHIL (OHS): 59.5 % (ref 38–73)
SP GR UR STRIP: 1.02
WBC # BLD AUTO: 8.5 K/UL (ref 3.9–12.7)

## 2025-06-24 PROCEDURE — 85025 COMPLETE CBC W/AUTO DIFF WBC: CPT

## 2025-06-24 PROCEDURE — 36415 COLL VENOUS BLD VENIPUNCTURE: CPT

## 2025-06-24 PROCEDURE — 81003 URINALYSIS AUTO W/O SCOPE: CPT

## 2025-06-24 PROCEDURE — 87186 SC STD MICRODIL/AGAR DIL: CPT

## 2025-06-25 NOTE — PRE-PROCEDURE INSTRUCTIONS
Called AND LEFT VOICEMAIL about the following:     Please arrive to [facility] ([facility] at 5:30 AM on THURSDAY 6/26/25 for your scheduled procedure.  Address: [address] [place] [address] [place]. [address] (2nd Building on left, 1st Floor Lobby)    NO FOOD, DRINK OR TOBACCO PRODUCTS AFTER MIDNIGHT THE NIGHT BEFORE SURGERY.     Do not eat after 12 midnight, Do not smoke or use chewing tobacco after 12 midnight!  OK to brush teeth, but no gum, candy, or mints!      Thank you,  -[facility] Pre [facility]  Mon-Fri 7:30 am - 4 pm [phone]

## 2025-06-25 NOTE — PRE-PROCEDURE INSTRUCTIONS
"Spoke with patient on 6/23/25 relative to his last dose of Plavix and Aspirin. Patient stated he took his last Plavix on 6-22-25 and last aspirin dose on 6-23-25. Dr. Bajwa's office informed via in basket message on 6/24/25. Received in basket message from Dr. Bajwa's office via in [employer] message stating Dr. Bajwa spoke to patient and patient stated he has been off of Plavix for 6 months and off of Aspirin since last week. Called patient this morning to confirm his last dose of Plavix and Aspirin. Patient confirmed that his last dose of aspirin was "one day last week" and his last dose of Plavix was "a long time ago".   "

## 2025-06-25 NOTE — PRE-PROCEDURE INSTRUCTIONS
Called and spoke with PATIENT about the following:     Please arrive to [facility] ([facility]) at 5:30 AM on THURSDAY 6/26/25 for your scheduled procedure.  Address: [address] [place] [address] [place]. [address] (2nd Building on left, 1st Floor Lobby)    NO FOOD, DRINK OR TOBACCO PRODUCTS AFTER MIDNIGHT THE NIGHT BEFORE SURGERY.     Do not eat after 12 midnight, Do not smoke or use chewing tobacco after 12 midnight!  OK to brush teeth, but no gum, candy, or mints!       Thank you,  -[facility] Pre [facility].  Mon-Fri 7:30 am - 4 pm [phone]

## 2025-06-26 ENCOUNTER — ANESTHESIA EVENT (OUTPATIENT)
Dept: SURGERY | Facility: HOSPITAL | Age: 70
End: 2025-06-26
Payer: COMMERCIAL

## 2025-06-26 ENCOUNTER — HOSPITAL ENCOUNTER (OUTPATIENT)
Facility: HOSPITAL | Age: 70
Discharge: HOME | End: 2025-06-26
Attending: UROLOGY | Admitting: UROLOGY
Payer: COMMERCIAL

## 2025-06-26 ENCOUNTER — ANESTHESIA (OUTPATIENT)
Dept: SURGERY | Facility: HOSPITAL | Age: 70
End: 2025-06-26
Payer: COMMERCIAL

## 2025-06-26 VITALS
SYSTOLIC BLOOD PRESSURE: 164 MMHG | DIASTOLIC BLOOD PRESSURE: 77 MMHG | HEIGHT: 75 IN | RESPIRATION RATE: 14 BRPM | TEMPERATURE: 98 F | HEART RATE: 71 BPM | WEIGHT: 172.63 LBS | BODY MASS INDEX: 21.46 KG/M2 | OXYGEN SATURATION: 100 %

## 2025-06-26 DIAGNOSIS — R97.20 ELEVATED PSA: Primary | ICD-10-CM

## 2025-06-26 LAB — POCT GLUCOSE: 175 MG/DL (ref 70–110)

## 2025-06-26 PROCEDURE — 63600175 PHARM REV CODE 636 W HCPCS: Performed by: UROLOGY

## 2025-06-26 PROCEDURE — 36000704 HC OR TIME LEV I 1ST 15 MIN: Performed by: UROLOGY

## 2025-06-26 PROCEDURE — 37000009 HC ANESTHESIA EA ADD 15 MINS: Performed by: UROLOGY

## 2025-06-26 PROCEDURE — 63600175 PHARM REV CODE 636 W HCPCS: Performed by: NURSE ANESTHETIST, CERTIFIED REGISTERED

## 2025-06-26 PROCEDURE — 55700 PR BIOPSY OF PROSTATE,NEEDLE/PUNCH: CPT | Mod: ,,, | Performed by: UROLOGY

## 2025-06-26 PROCEDURE — 71000033 HC RECOVERY, INTIAL HOUR: Performed by: UROLOGY

## 2025-06-26 PROCEDURE — 37000008 HC ANESTHESIA 1ST 15 MINUTES: Performed by: UROLOGY

## 2025-06-26 PROCEDURE — 88305 TISSUE EXAM BY PATHOLOGIST: CPT | Mod: 26,,, | Performed by: PATHOLOGY

## 2025-06-26 PROCEDURE — 76872 US TRANSRECTAL: CPT | Mod: 26,,, | Performed by: UROLOGY

## 2025-06-26 PROCEDURE — 88305 TISSUE EXAM BY PATHOLOGIST: CPT | Mod: TC,91 | Performed by: UROLOGY

## 2025-06-26 PROCEDURE — 25000003 PHARM REV CODE 250: Performed by: ANESTHESIOLOGY

## 2025-06-26 PROCEDURE — 36000705 HC OR TIME LEV I EA ADD 15 MIN: Performed by: UROLOGY

## 2025-06-26 PROCEDURE — 63600175 PHARM REV CODE 636 W HCPCS: Performed by: ANESTHESIOLOGY

## 2025-06-26 PROCEDURE — 71000015 HC POSTOP RECOV 1ST HR: Performed by: UROLOGY

## 2025-06-26 PROCEDURE — 27201423 OPTIME MED/SURG SUP & DEVICES STERILE SUPPLY: Performed by: UROLOGY

## 2025-06-26 RX ORDER — SODIUM CHLORIDE, SODIUM LACTATE, POTASSIUM CHLORIDE, CALCIUM CHLORIDE 600; 310; 30; 20 MG/100ML; MG/100ML; MG/100ML; MG/100ML
INJECTION, SOLUTION INTRAVENOUS CONTINUOUS PRN
Status: DISCONTINUED | OUTPATIENT
Start: 2025-06-26 | End: 2025-06-26

## 2025-06-26 RX ORDER — OXYCODONE AND ACETAMINOPHEN 5; 325 MG/1; MG/1
1 TABLET ORAL
Status: DISCONTINUED | OUTPATIENT
Start: 2025-06-26 | End: 2025-06-26 | Stop reason: HOSPADM

## 2025-06-26 RX ORDER — ONDANSETRON HYDROCHLORIDE 2 MG/ML
INJECTION, SOLUTION INTRAVENOUS
Status: DISCONTINUED | OUTPATIENT
Start: 2025-06-26 | End: 2025-06-26

## 2025-06-26 RX ORDER — LIDOCAINE HYDROCHLORIDE 10 MG/ML
INJECTION, SOLUTION EPIDURAL; INFILTRATION; INTRACAUDAL; PERINEURAL
Status: DISCONTINUED | OUTPATIENT
Start: 2025-06-26 | End: 2025-06-26

## 2025-06-26 RX ORDER — KETOROLAC TROMETHAMINE 30 MG/ML
15 INJECTION, SOLUTION INTRAMUSCULAR; INTRAVENOUS EVERY 8 HOURS PRN
Status: DISCONTINUED | OUTPATIENT
Start: 2025-06-26 | End: 2025-06-26 | Stop reason: HOSPADM

## 2025-06-26 RX ORDER — CIPROFLOXACIN 2 MG/ML
400 INJECTION, SOLUTION INTRAVENOUS
Status: COMPLETED | OUTPATIENT
Start: 2025-06-26 | End: 2025-06-26

## 2025-06-26 RX ORDER — PROPOFOL 10 MG/ML
VIAL (ML) INTRAVENOUS
Status: DISCONTINUED | OUTPATIENT
Start: 2025-06-26 | End: 2025-06-26

## 2025-06-26 RX ORDER — CEFTRIAXONE 1 G/1
1 INJECTION, POWDER, FOR SOLUTION INTRAMUSCULAR; INTRAVENOUS
Status: COMPLETED | OUTPATIENT
Start: 2025-06-26 | End: 2025-06-26

## 2025-06-26 RX ORDER — HYDROMORPHONE HYDROCHLORIDE 2 MG/ML
0.2 INJECTION, SOLUTION INTRAMUSCULAR; INTRAVENOUS; SUBCUTANEOUS EVERY 5 MIN PRN
Status: DISCONTINUED | OUTPATIENT
Start: 2025-06-26 | End: 2025-06-26 | Stop reason: HOSPADM

## 2025-06-26 RX ORDER — MIDAZOLAM HYDROCHLORIDE 1 MG/ML
INJECTION INTRAMUSCULAR; INTRAVENOUS
Status: DISCONTINUED | OUTPATIENT
Start: 2025-06-26 | End: 2025-06-26

## 2025-06-26 RX ORDER — HYDROCODONE BITARTRATE AND ACETAMINOPHEN 7.5; 325 MG/1; MG/1
1 TABLET ORAL EVERY 6 HOURS PRN
Qty: 6 TABLET | Refills: 0 | Status: SHIPPED | OUTPATIENT
Start: 2025-06-26

## 2025-06-26 RX ORDER — ONDANSETRON HYDROCHLORIDE 2 MG/ML
4 INJECTION, SOLUTION INTRAVENOUS DAILY PRN
Status: DISCONTINUED | OUTPATIENT
Start: 2025-06-26 | End: 2025-06-26 | Stop reason: HOSPADM

## 2025-06-26 RX ORDER — ACETAMINOPHEN 10 MG/ML
1000 INJECTION, SOLUTION INTRAVENOUS ONCE
Status: COMPLETED | OUTPATIENT
Start: 2025-06-26 | End: 2025-06-26

## 2025-06-26 RX ADMIN — SODIUM CHLORIDE, SODIUM LACTATE, POTASSIUM CHLORIDE, AND CALCIUM CHLORIDE: .6; .31; .03; .02 INJECTION, SOLUTION INTRAVENOUS at 08:06

## 2025-06-26 RX ADMIN — CIPROFLOXACIN 400 MG: 2 INJECTION, SOLUTION INTRAVENOUS at 08:06

## 2025-06-26 RX ADMIN — CEFTRIAXONE SODIUM 1 G: 1 INJECTION, POWDER, FOR SOLUTION INTRAMUSCULAR; INTRAVENOUS at 08:06

## 2025-06-26 RX ADMIN — PROPOFOL 50 MG: 10 INJECTION, EMULSION INTRAVENOUS at 08:06

## 2025-06-26 RX ADMIN — PROPOFOL 80 MG: 10 INJECTION, EMULSION INTRAVENOUS at 08:06

## 2025-06-26 RX ADMIN — PROPOFOL 30 MG: 10 INJECTION, EMULSION INTRAVENOUS at 08:06

## 2025-06-26 RX ADMIN — OXYCODONE HYDROCHLORIDE AND ACETAMINOPHEN 1 TABLET: 5; 325 TABLET ORAL at 09:06

## 2025-06-26 RX ADMIN — MIDAZOLAM HYDROCHLORIDE 2 MG: 1 INJECTION, SOLUTION INTRAMUSCULAR; INTRAVENOUS at 08:06

## 2025-06-26 RX ADMIN — ACETAMINOPHEN 1000 MG: 10 INJECTION INTRAVENOUS at 09:06

## 2025-06-26 RX ADMIN — ONDANSETRON 4 MG: 2 INJECTION INTRAMUSCULAR; INTRAVENOUS at 08:06

## 2025-06-26 RX ADMIN — LIDOCAINE HYDROCHLORIDE 100 MG: 10 SOLUTION INTRAVENOUS at 08:06

## 2025-06-26 RX ADMIN — PROPOFOL 40 MG: 10 INJECTION, EMULSION INTRAVENOUS at 08:06

## 2025-06-26 NOTE — INTERVAL H&P NOTE
Discussed with pt that the MRI files are not able to be transferred to our [employer] equipment right now. Discussed rescheduling vs cognitive fusion biopsy. Pt elects for cognitive fusion biopsy.

## 2025-06-26 NOTE — H&P
CC: elevated PSA    History of Present Illness:   Nieves Torres is a 69 y.o. male here for prostate biopsy    6/26/25-Here for [place] biopsy. No interval change in H&P.     4/2/25-MRI read as PI RADS 4. Taking flomax. Pt reports nocturia x 1-2, but still doesn't feel like he is emptying. No gross hematuria or dysuria.     3/21/25-Pt lost to follow up after last visit. Never got the MRI. Can't remember why. Pt reports a weak stream and intermittency. Nocturia x 4. Feelings of incomplete emptying. No gross hematuria. Sometimes he has urgency. No dysuria. No longer taking flomax because he ran out.     12/13/22-66yo male, here for elevated PSA of 7.8. Pt denies any prior urologic history. Sometimes he has hesitancy. No gross hematuria. Slight dysuria. Sometimes has incomplete emptying. Nocturia x 2.       Review of Systems   Constitutional:  Negative for chills and fever.   Respiratory:  Negative for shortness of breath.    Cardiovascular:  Negative for chest pain.   Gastrointestinal:  Negative for abdominal pain.   Genitourinary:  Positive for difficulty urinating and nocturia. Negative for dysuria and hematuria.   Neurological:  Negative for dizziness and syncope.   All other systems reviewed and are negative.        Past Medical History:   Diagnosis Date    Coronary artery disease     Emphysema, unspecified     Hypertension     Type 2 diabetes mellitus without complications        Past Surgical History:   Procedure Laterality Date    CAROTID STENT      EXAMINATION UNDER ANESTHESIA Left 12/12/2023    Procedure: Exam under anesthesia;  Surgeon: Scout Lainez MD;  Location: [facility] OR;  Service: General;  Laterality: Left;    INCISION AND DRAINAGE OF PERIRECTAL REGION N/A 12/12/2023    Procedure: INCISION AND DRAINAGE, PERIRECTAL REGION;  Surgeon: Scout Lainez MD;  Location: [facility] OR;  Service: General;  Laterality: N/A;  abcess       Family History   Problem Relation Name Age of Onset    Diabetes Mother      Cancer Father       Prostate cancer Father      Breast cancer Neg Hx         Social History     Tobacco Use    Smoking status: Every Day     Current packs/day: 1.00     Average packs/day: 0.8 packs/day for 60.3 years (45.3 ttl pk-yrs)     Types: Cigarettes     Start date: 2/20/1995    Smokeless tobacco: Never    Tobacco comments:     HOLD MIDNIGHT PRIOR to surgery   Vaping Use    Vaping status: Every Day   Substance Use Topics    Alcohol use: Not Currently     Alcohol/week: 21.0 standard drinks of alcohol     Types: 21 Cans of beer per week     Comment: Instructed pt. to stop ETOH beginning today. Pt. verbalized understanding.    Drug use: Never       Current Facility-Administered Medications   Medication Dose Route Frequency Provider Last Rate Last Admin    cefTRIAXone injection 1 g  1 g Intravenous On Call Procedure Patricia Bajwa MD        ciprofloxacin (CIPRO)400mg/200ml D5W IVPB 400 mg  400 mg Intravenous On Call Procedure Patricia Bajwa MD           Review of patient's allergies indicates:  No Known Allergies    Physical Exam  Vitals:    06/26/25 0606   BP: (!) 176/80   Pulse: 85   Resp: 18   Temp: 97.7 °F (36.5 °C)       General: Well-developed, well-nourished in no acute distress  HEENT: Normocephalic, atraumatic, Extraocular movements intact  Neck: supple, trachea midline, no cervical or supraclavicular lymphadenopathy  Respirations: even and unlabored  Rectal: 3/21/25->40g prostate, no nodules or tenderness. No gross blood  Extremities: atraumatic, moves all equally, no clubbing, cyanosis or edema  Psych: normal affect  Skin: warm and dry, no lesions  Neuro: Alert and oriented, Cranial nerves II-XII grossly intact      Urinalysis  Negative for blood, LE, Nit    PSA  3/21/25: 11.1    Assessment:   1. Pre-op testing  Urine Culture High Risk    Ambulatory referral/consult to Pre-Admit    X-Ray Chest PA And Lateral Pre-OP    Comprehensive Metabolic Panel    Basic Metabolic Panel      2. Elevated PSA  POCT  Urinalysis, Dipstick, Automated, W/O Scope    Place in Outpatient - Extended Recovery    Case Request Operating Room: BIOPSY, PROSTATE    Vital Signs     Diet NPO    Place sequential compression device      3. BPH with obstruction/lower urinary tract symptoms  POCT Urinalysis, Dipstick, Automated, W/O Scope          Plan:  Elevated PSA  -     Place in Outpatient - Extended Recovery; Standing  -     Vital Signs ; Standing  -     Diet NPO; Standing  -     Place sequential compression device; Standing    Other orders  -     IP VTE LOW RISK PATIENT; Standing  -     ciprofloxacin (CIPRO)400mg/200ml D5W IVPB 400 mg  -     cefTRIAXone injection 1 g  -     Admit to Phase 1 PACU, transfer to Phase 2 per protocol when indicated ; Standing  -     Vital signs; Standing  -     HYDROmorphone (PF) injection 0.2 mg; Refill: 0  -     oxyCODONE-acetaminophen 5-325 mg per tablet 1 tablet; Refill: 0  -     ketorolac injection 15 mg  -     ondansetron injection 4 mg  -     Intake and output Per protocol; Standing  -     Apply warming blanket; Standing  -     Notify Anesthesiologist; Standing  -     Notify Physician - Potential Need of Opioid Reversal; Standing  -     Oxygen Continuous; Standing  -     Pulse Oximetry Continuous; Standing  -     POCT glucose; Standing    Discussed UroNav biopsy in detail, and pt in agreement. Risks/benefits discussed. Will proceed today.

## 2025-06-26 NOTE — PLAN OF CARE
Pt agreeable with care; slightly apprehensive, but accepting;all belongings to be kept with wife Debby; kristians at bedside, awaiting MD round; consent verified

## 2025-06-26 NOTE — ANESTHESIA POSTPROCEDURE EVALUATION
Anesthesia Post Evaluation    Patient: Nieves Torres    Procedure(s) Performed: Procedure(s) (LRB):  BIOPSY, PROSTATE (N/A)    Final Anesthesia Type: MAC      Patient location during evaluation: PACU  Patient participation: Yes- Able to Participate  Level of consciousness: awake and alert  Post-procedure vital signs: reviewed and stable  Airway patency: patent      Anesthetic complications: no      Cardiovascular status: blood pressure returned to baseline  Respiratory status: unassisted and spontaneous ventilation  Hydration status: euvolemic  Follow-up not needed.              Vitals Value Taken Time   /82 06/26/25 09:53   Temp 36.6 °C (97.9 °F) 06/26/25 09:50   Pulse 70 06/26/25 09:52   Resp 18 06/26/25 09:52   SpO2 100 % 06/26/25 09:52   Vitals shown include unfiled device data.      Event Time   Out of Recovery 09:58:04         Pain/Aneta Score: Pain Rating Prior to Med Admin: 5 (6/26/2025  9:43 AM)  Aneta Score: 10 (6/26/2025 10:00 AM)

## 2025-06-26 NOTE — ANESTHESIA PREPROCEDURE EVALUATION
06/26/2025  Nieves Torres is a 69 y.o., male.      Pre-op Assessment    I have reviewed the Patient Summary Reports.     I have reviewed the Nursing Notes. I have reviewed the NPO Status.      Review of Systems  Anesthesia Hx:  No problems with previous Anesthesia                Social:  Smoker       Hematology/Oncology:  Hematology Normal   Oncology Normal                                   Cardiovascular:     Hypertension   CAD       CHF       ECG has been reviewed. Coronary Stents    Cleared by cards.  Moderate risk.      EF 55%                           Pulmonary:   COPD                     Renal/:  Renal/ Normal                 Hepatic/GI:  Hepatic/GI Normal                    Neurological:  Neurology Normal                                      Endocrine:  Diabetes           Dermatological:  Skin Normal    Psych:  Psychiatric Normal                  Patient Active Problem List   Diagnosis    Hypertension associated with diabetes    Hyperlipidemia associated with type 2 diabetes mellitus    Type 2 diabetes mellitus without complication, without long-term current use of insulin    Coronary artery disease involving native coronary artery of native heart without angina pectoris    Lipoma of back    Chronic systolic (congestive) heart failure    Tobacco use    Elevated PSA, between 10 and less than 20 ng/ml       Physical Exam  General: Well nourished    Airway:  Mallampati: I   Mouth Opening: Normal  TM Distance: Normal  Neck ROM: Normal ROM    Dental:  Intact        Anesthesia Plan  Type of Anesthesia, risks & benefits discussed:    Anesthesia Type: Gen ETT, Gen Supraglottic Airway  Intra-op Monitoring Plan: Standard ASA Monitors  Post Op Pain Control Plan: multimodal analgesia  Induction:  IV  Airway Plan: Direct, Post-Induction  Informed Consent: Informed consent signed with the Patient and all parties  understand the risks and agree with anesthesia plan.  All questions answered.   ASA Score: 3  Day of Surgery Review of History & Physical: H&P Update referred to the surgeon/provider.I have interviewed and examined the patient. I have reviewed the patient's H&P dated: There are no significant changes. H&P completed by Anesthesiologist.    Ready For Surgery From Anesthesia Perspective.     .      Chemistry        Component Value Date/Time     06/12/2025 0853     12/11/2023 1206    K 4.4 06/12/2025 0853    K 4.2 12/11/2023 1206     06/12/2025 0853     12/11/2023 1206    CO2 26 06/12/2025 0853    CO2 24 12/11/2023 1206    BUN 16 06/12/2025 0853    CREATININE 1.3 06/12/2025 0853     (H) 06/12/2025 0853     (H) 12/11/2023 1206        Component Value Date/Time    CALCIUM 9.3 06/12/2025 0853    CALCIUM 10.1 12/11/2023 1206    ALKPHOS 93 06/12/2025 0853    ALKPHOS 122 12/11/2023 1206    AST 16 06/12/2025 0853    AST 17 12/11/2023 1206    ALT 11 06/12/2025 0853    ALT 13 12/11/2023 1206    BILITOT 0.8 06/12/2025 0853    BILITOT 0.6 12/11/2023 1206        Lab Results   Component Value Date    WBC 8.50 06/24/2025    HGB 11.8 (L) 06/24/2025    HCT 35.9 (L) 06/24/2025    MCV 96 06/24/2025     06/24/2025       Sinus tachycardia   Otherwise normal ECG   When compared with ECG of 11-Dec-2023 11:40,   No significant change was found   Confirmed by Néstor Figueroa ([id number]) on 2/20/2025 1:56:18 PM

## 2025-06-26 NOTE — OP NOTE
Date: 06/26/2025    Preop diagnosis: Elevated PSA    Post-op diagnosis: Elevated PSA    Procedure: (1) Transrectal Prostate Biopsy                      (2) Transrectal ultrasound of prostate with cognitive fusion of images                     (3) Ultrasound Guidance of Prostate Biopsy needles    Anesthesia: Monitored anesthesia care    Estimated blood loss: 3cc    Complications: none    Specimen: Right and left base, mid and apex    Indications for procedure: Pt is a 69 y.o.yo male who presented with an elevated PSA. He underwent MRI of the prostate which revealed a PI RADS 4 lesion at the left peripheral mid gland. Risks/benefits discussed and pt elected to proceed with the above named procedure.         Detail: Antibiotic prophylaxis was provided using Rocephin and cipro. After proper consents were obtained, the patient was prepped and draped in normal fashion in the left lateral decubitus position for TRUS/Bx.  Rectal exam noted a palpable nodule near the left lateral mid gland.  The U/S with rectal probe was into the patient's rectum.  Systematic review was performed of the prostate, noting an approximately 1 cm hypoechoic lesion at the left lateral mid gland. The prostate measured to be 44cc on Ultrasound, 41cc on MRI. The region of interest was first targeted.  This was best visualized in the transverse plane and multiple samples were taken through the transverse plane.  Doppler was utilized and revealed increased blood flow in this region.  Biopsy was then performed using an 18Ga biopsy needle in a standard fashion directed at the base, mid and apex bilaterally for a total of 18 cores. The patient tolerated the procedure well. Estimated blood loss was 3cc.   He awakened in good condition.     I had a detailed discussion with the patient regarding post-TRUS biopsy fever and need to go to the ED for admission for IV antibiotics for any temperature above 100.4 degrees. ]

## 2025-06-27 ENCOUNTER — TELEPHONE (OUTPATIENT)
Dept: UROLOGY | Facility: CLINIC | Age: 70
End: 2025-06-27
Payer: COMMERCIAL

## 2025-06-27 LAB
BACTERIA UR CULT: ABNORMAL
DHEA SERPL-MCNC: NORMAL
ESTROGEN SERPL-MCNC: NORMAL PG/ML
INSULIN SERPL-ACNC: NORMAL U[IU]/ML
LAB AP CLINICAL INFORMATION: NORMAL
LAB AP GROSS DESCRIPTION: NORMAL
LAB AP PERFORMING LOCATION(S): NORMAL
LAB AP REPORT FOOTNOTES: NORMAL

## 2025-06-27 NOTE — TELEPHONE ENCOUNTER
.Outgoing call placed to patient, patient verified identifiers, patient stated he is doing ok today, notified of below, confirmed his appt and no further assistance needed at this time.        ----- Message from Patricia Bajwa MD sent at 6/26/2025  9:17 AM CDT -----  Please schedule pt for a post-biopsy follow up when I get back.

## 2025-06-30 ENCOUNTER — RESULTS FOLLOW-UP (OUTPATIENT)
Dept: UROLOGY | Facility: CLINIC | Age: 70
End: 2025-06-30

## 2025-06-30 ENCOUNTER — TELEPHONE (OUTPATIENT)
Dept: UROLOGY | Facility: CLINIC | Age: 70
End: 2025-06-30
Payer: COMMERCIAL

## 2025-06-30 ENCOUNTER — HOSPITAL ENCOUNTER (OUTPATIENT)
Dept: RADIOLOGY | Facility: HOSPITAL | Age: 70
Discharge: HOME | End: 2025-06-30
Attending: INTERNAL MEDICINE
Payer: COMMERCIAL

## 2025-06-30 DIAGNOSIS — R93.89 ABNORMAL SCREENING CT OF CHEST: ICD-10-CM

## 2025-06-30 DIAGNOSIS — R91.1 LEFT UPPER LOBE PULMONARY NODULE: Primary | ICD-10-CM

## 2025-06-30 DIAGNOSIS — R91.1 LEFT UPPER LOBE PULMONARY NODULE: ICD-10-CM

## 2025-06-30 PROCEDURE — A9552 F18 FDG: HCPCS | Performed by: INTERNAL MEDICINE

## 2025-06-30 PROCEDURE — 78815 PET IMAGE W/CT SKULL-THIGH: CPT | Mod: TC

## 2025-06-30 PROCEDURE — 78815 PET IMAGE W/CT SKULL-THIGH: CPT | Mod: 26,PI,, | Performed by: RADIOLOGY

## 2025-06-30 RX ORDER — FLUDEOXYGLUCOSE F18 500 MCI/ML
11.89 INJECTION INTRAVENOUS
Status: COMPLETED | OUTPATIENT
Start: 2025-06-30 | End: 2025-06-30

## 2025-06-30 RX ADMIN — FLUDEOXYGLUCOSE F-18 11.89 MILLICURIE: 500 INJECTION INTRAVENOUS at 08:06

## 2025-06-30 NOTE — TELEPHONE ENCOUNTER
----- Message from Patricia Bajwa MD sent at 6/30/2025  1:12 PM CDT -----  Notify pt of UTI on his urine culture. Amoxicillin sent to his pharmacy.   ----- Message -----  From: Lab, Background User  Sent: 6/24/2025  11:35 AM CDT  To: Patricia Bajwa MD

## 2025-06-30 NOTE — TELEPHONE ENCOUNTER
Spoke with patient who was able to provide acceptable patient identifiers prior to start of conversation. Patient notified of urine culture results that showed UTI and that antibiotics have been sent to local [facility]. Patient verbalized understanding, no further assistance needed.

## 2025-07-06 ENCOUNTER — PATIENT OUTREACH (OUTPATIENT)
Facility: OTHER | Age: 70
End: 2025-07-06
Payer: COMMERCIAL

## 2025-07-06 ENCOUNTER — NURSE TRIAGE (OUTPATIENT)
Dept: ADMINISTRATIVE | Facility: CLINIC | Age: 70
End: 2025-07-06
Payer: COMMERCIAL

## 2025-07-06 ENCOUNTER — OCHSNER VIRTUAL EMERGENCY DEPARTMENT (OUTPATIENT)
Facility: CLINIC | Age: 70
End: 2025-07-06
Payer: COMMERCIAL

## 2025-07-06 DIAGNOSIS — L02.31 ABSCESS OF BUTTOCK: Primary | ICD-10-CM

## 2025-07-06 NOTE — TELEPHONE ENCOUNTER
"Pt wife on the line and with pt. Pt has a painful boil on his buttocks for the last 3 days becoming more painful yesterday. Current pain 8/10, has to lie on stomach, unable to sit d/t pain. Size is approximate to a natividad. No current bleeding or drainage. No black, dark purple or blisters in the area.  Reports redness around the area. Recommended dispo is to see hcp (or pcp triage) within 4 hours. Contacted [facility] Dr Waterman. "Although he can try frequent warm compresses & topical antibiotic ointment, if it has been present for 3 days he will likely need to be seen to have an I and D performed. I am not sure how many primary care offices will do that, so alternative may be an urgent care". Updated pt wife and she VU.               Reason for Disposition   SEVERE pain (e.g., excruciating)    Additional Information   Negative: [1] Widespread rash AND [2] bright red, sunburn-like AND [3] too weak to stand   Negative: Sounds like a life-threatening emergency to the triager   Negative: Widespread red rash   Negative: Black (necrotic), dark purple, or blisters develop in area of boil (abscess)   Negative: Patient sounds very sick or weak to the triager    Protocols used: Boil (Skin Abscess)-A-AH    "

## 2025-07-06 NOTE — PROGRESS NOTES
"Patient contacted on call nurse reporting "has a painful boil on his buttocks for the last 3 days becoming more painful yesterday. Current pain 8/10, has to lie on stomach, unable to sit d/t pain. Size is approximate to a natividad. No current bleeding or drainage. No black, dark purple or blisters in the area. Reports redness around the area. " Consulted Dr. Waterman, patient advised to go to UC, Instructed to notify with any new or worsening complaints. Luis Carlos follow up 7/8/25  "

## 2025-07-06 NOTE — PLAN OF CARE-OVED
[facility] Plan of Care Note  Referral Source: Nurse On-Call                               Chief Complaint   Patient presents with    Abscess     MDM: The patient's wife contacted nurse on-call due to him having a boil on buttock for the past 3 days now so painful he can not sit down.  Reports it is a proximally the size of a natividad, with no surrounding rash.  Chart review shows he is already taking amoxicillin for the eye.  Advised that although he may use warm compresses and topical antibiotics, given duration and degree of pain, we recommend evaluation in urgent care as will likely need I&D.  Provided with location of nearby facility.  Expressed understanding.    Recommendation: Urgent Care                            Encounter Diagnosis   Name Primary?    Abscess of buttock Yes

## 2025-07-09 ENCOUNTER — OFFICE VISIT (OUTPATIENT)
Dept: PULMONOLOGY | Facility: CLINIC | Age: 70
End: 2025-07-09
Payer: COMMERCIAL

## 2025-07-09 VITALS
HEART RATE: 90 BPM | BODY MASS INDEX: 21.87 KG/M2 | OXYGEN SATURATION: 99 % | SYSTOLIC BLOOD PRESSURE: 142 MMHG | HEIGHT: 75 IN | WEIGHT: 175.94 LBS | DIASTOLIC BLOOD PRESSURE: 65 MMHG

## 2025-07-09 DIAGNOSIS — F17.210 SMOKING GREATER THAN 40 PACK YEARS: ICD-10-CM

## 2025-07-09 DIAGNOSIS — F17.210 TOBACCO DEPENDENCE DUE TO CIGARETTES: ICD-10-CM

## 2025-07-09 DIAGNOSIS — R93.89 ABNORMAL SCREENING CT OF CHEST: ICD-10-CM

## 2025-07-09 DIAGNOSIS — J43.2 CENTRILOBULAR EMPHYSEMA: ICD-10-CM

## 2025-07-09 DIAGNOSIS — R91.1 LEFT UPPER LOBE PULMONARY NODULE: Primary | ICD-10-CM

## 2025-07-09 PROCEDURE — 3078F DIAST BP <80 MM HG: CPT | Mod: CPTII,S$GLB,, | Performed by: INTERNAL MEDICINE

## 2025-07-09 PROCEDURE — 1101F PT FALLS ASSESS-DOCD LE1/YR: CPT | Mod: CPTII,S$GLB,, | Performed by: INTERNAL MEDICINE

## 2025-07-09 PROCEDURE — 3061F NEG MICROALBUMINURIA REV: CPT | Mod: CPTII,S$GLB,, | Performed by: INTERNAL MEDICINE

## 2025-07-09 PROCEDURE — 3044F HG A1C LEVEL LT 7.0%: CPT | Mod: CPTII,S$GLB,, | Performed by: INTERNAL MEDICINE

## 2025-07-09 PROCEDURE — 99214 OFFICE O/P EST MOD 30 MIN: CPT | Mod: S$GLB,,, | Performed by: INTERNAL MEDICINE

## 2025-07-09 PROCEDURE — 4010F ACE/ARB THERAPY RXD/TAKEN: CPT | Mod: CPTII,S$GLB,, | Performed by: INTERNAL MEDICINE

## 2025-07-09 PROCEDURE — 3288F FALL RISK ASSESSMENT DOCD: CPT | Mod: CPTII,S$GLB,, | Performed by: INTERNAL MEDICINE

## 2025-07-09 PROCEDURE — 3066F NEPHROPATHY DOC TX: CPT | Mod: CPTII,S$GLB,, | Performed by: INTERNAL MEDICINE

## 2025-07-09 PROCEDURE — 1159F MED LIST DOCD IN RCRD: CPT | Mod: CPTII,S$GLB,, | Performed by: INTERNAL MEDICINE

## 2025-07-09 PROCEDURE — 99999 PR PBB SHADOW E&M-EST. PATIENT-LVL IV: CPT | Mod: PBBFAC,,, | Performed by: INTERNAL MEDICINE

## 2025-07-09 PROCEDURE — 1160F RVW MEDS BY RX/DR IN RCRD: CPT | Mod: CPTII,S$GLB,, | Performed by: INTERNAL MEDICINE

## 2025-07-09 PROCEDURE — 1125F AMNT PAIN NOTED PAIN PRSNT: CPT | Mod: CPTII,S$GLB,, | Performed by: INTERNAL MEDICINE

## 2025-07-09 PROCEDURE — 3077F SYST BP >= 140 MM HG: CPT | Mod: CPTII,S$GLB,, | Performed by: INTERNAL MEDICINE

## 2025-07-09 PROCEDURE — 3008F BODY MASS INDEX DOCD: CPT | Mod: CPTII,S$GLB,, | Performed by: INTERNAL MEDICINE

## 2025-07-09 NOTE — PROGRESS NOTES
"                                         Pulmonary Outpatient Follow Up Visit     Subjective:       Patient ID: Nieves Torres is a 70 y.o. male.    Chief Complaint: Follow-up (1 month ) and Pulmonary Nodules        History of Present Illness    CHIEF COMPLAINT:  Patient presents today for follow up of lung nodule.    LUNG NODULE:  He reports a lung nodule that has grown from 6 mm in 2021 to 9 mm in April. Recent PET scan demonstrated the nodule did not  tracer, indicating approximately 90% probability of non-malignancy. He acknowledges prior documentation of a 5 mm nodule at [facility] in 2021, potentially referencing the same lesion. He denies any respiratory symptoms or concerns related to the nodule.    RESPIRATORY:  He has a known history of emphysema. He reports occasional mucus production with cough and uses rescue inhaler every 2-3 days. He denies experiencing shortness of breath. His respiratory symptoms appear stable with current management.    SMOKING STATUS:  He obtained nicotine patches but is not currently using them. He previously declined Chantix due to concerns about potential hallucination side effects.    MUSCULOSKELETAL:  He reports ongoing back pain with minimal relief from muscle relaxants.                   Review of Systems   Constitutional:  Negative for fever.   Respiratory:  Negative for dyspnea on extertion.        Encounter Medications[1]    Objective:     Vital Signs (Most Recent)  Vital Signs  Pulse: 90  SpO2: 99 %  BP: (!) 142/65 (per patient have been rushing to get here and knew it would be a little high)  BP Location: Right arm  Patient Position: Sitting  Pain Score:   8  Pain Loc: Back (per patient lower back pain)  Height and Weight  Height: 6' 3" (190.5 cm)  Weight: 79.8 kg (175 lb 14.8 oz)  BSA (Calculated - sq m): 2.05 sq meters  BMI (Calculated): 22  Weight in (lb) to have BMI = 25: 199.6]  Wt Readings from Last 2 Encounters:   07/09/25 79.8 kg (175 lb 14.8 oz) " "  06/26/25 78.3 kg (172 lb 9.9 oz)       Physical Exam   Constitutional: He is oriented to person, place, and time. He appears well-developed. No distress.   Pulmonary/Chest: Normal expansion, hyperinflation and effort normal. No stridor. No respiratory distress. He has decreased breath sounds. He has no wheezes. He has no rhonchi.   Neurological: He is alert and oriented to person, place, and time.   Psychiatric: He has a normal mood and affect. His behavior is normal. Judgment and thought content normal.   Nursing note and vitals reviewed.      Laboratory  Lab Results   Component Value Date    WBC 8.50 06/24/2025    RBC 3.74 (L) 06/24/2025    HGB 11.8 (L) 06/24/2025    HCT 35.9 (L) 06/24/2025    MCV 96 06/24/2025    MCH 31.6 (H) 06/24/2025    MCHC 32.9 06/24/2025    RDW 13.2 06/24/2025     06/24/2025    MPV 9.5 06/24/2025    GRAN 10.9 (H) 12/11/2023    GRAN 73.4 (H) 12/11/2023    LYMPH 29.5 06/24/2025    LYMPH 2.51 06/24/2025    MONO 8.4 06/24/2025    MONO 0.71 06/24/2025    EOS 1.4 06/24/2025    EOS 0.12 06/24/2025    BASO 0.04 12/11/2023    EOSINOPHIL 0.3 12/11/2023    BASOPHIL 0.4 06/24/2025    BASOPHIL 0.03 06/24/2025       BMP  Lab Results   Component Value Date     06/12/2025    K 4.4 06/12/2025     06/12/2025    CO2 26 06/12/2025    BUN 16 06/12/2025    CREATININE 1.3 06/12/2025    CALCIUM 9.3 06/12/2025    ANIONGAP 6 (L) 06/12/2025    AST 16 06/12/2025    ALT 11 06/12/2025    PROT 7.8 06/12/2025       No results found for: "BNP"    Lab Results   Component Value Date    TSH 1.238 11/18/2022       No results found for: "SEDRATE"    No results found for: "CRP"  No results found for: "IGE"     No results found for: "ASPERGILLUS"  No results found for: "AFUMIGATUSCL"     No results found for: "ACE"     Diagnostic Results:  I have personally reviewed today the following studies:  As above    Assessment/Plan:   Left upper lobe pulmonary nodule PET negative    Smoking greater than 40 pack " years    Tobacco dependence due to cigarettes    Abnormal screening CT of chest    Centrilobular emphysema      Assessment & Plan    IMPRESSION:  - PET scan results negative for active cancer in lung nodule, providing ~90% reassurance.  - Continued surveillance recommended due to 10% residual risk and nodule growth from 6 mm to 9 mm.  - Biopsy not pursued at this time due to negative PET scan and associated risks.  - Emphysema noted on lung scan.  - Recommend daily inhaled corticosteroid to improve lung function and prevent exacerbations, but patient declined.    LUNG NODULE:  - Explained PET scan results and implications for cancer risk assessment.  - Discussed rationale for continued surveillance despite negative PET scan.  - Informed patient about risks associated with lung biopsy.  - Ordered CT Chest for end of September for continued surveillance of lung nodule.    NICOTINE DEPENDENCE:  - Patient to continue using nicotine patches for smoking cessation.    ASTHMA:  - Continue albuterol inhaler as needed, every 2-3 days.       Low-dose CT scan of the chest in 3 months and follow-up with me.  CT ordered by PMD.  Will be rescheduled for 3 months from PET  Follow up in about 3 months (around 10/9/2025).    This note was prepared using voice recognition system and is likely to have sound alike errors that may have been overlooked even after proof reading.  Please call me with any questions    Discussed diagnosis, its evaluation, treatment and usual course. All questions answered.      Pierre Shelby MD         [1]   Outpatient Encounter Medications as of 7/9/2025   Medication Sig Dispense Refill    albuterol (PROVENTIL/VENTOLIN HFA) 90 mcg/actuation inhaler Inhale 2 puffs into the lungs every 6 (six) hours as needed for Wheezing or Shortness of Breath (cough). Rescue 18 g 5    amoxicillin (AMOXIL) 500 MG capsule Take 1 capsule (500 mg total) by mouth 3 (three) times daily. 30 capsule 0    aspirin (ECOTRIN) 81 MG  EC tablet Take 81 mg by mouth once daily.      atorvastatin (LIPITOR) 40 MG tablet Take 1 tablet (40 mg total) by mouth every evening. 90 tablet 1    blood sugar diagnostic Strp To check BG 3 times daily, to use with insurance preferred meter 100 each 11    blood-glucose meter kit To check BG 3 times daily, to use with insurance preferred meter 1 each 0    empagliflozin (JARDIANCE) 10 mg tablet Take 1 tablet (10 mg total) by mouth once daily. 30 tablet 2    glimepiride (AMARYL) 2 MG tablet Take 1 tablet (2 mg total) by mouth once daily. 1300 90 tablet 1    HYDROcodone-acetaminophen (NORCO) 7.5-325 mg per tablet Take 1 tablet by mouth every 6 (six) hours as needed for Pain. 6 tablet 0    irbesartan (AVAPRO) 300 MG tablet Take 1 tablet (300 mg total) by mouth once daily. 90 tablet 1    lancets Misc To check BG 3 times daily, to use with insurance preferred meter 100 each 11    methocarbamoL (ROBAXIN) 750 MG Tab Take 1 tablet (750 mg total) by mouth nightly as needed (MUSCLE SPASM). 30 tablet 0    metoprolol succinate (TOPROL-XL) 50 MG 24 hr tablet Take 1 tablet (50 mg total) by mouth once daily. 90 tablet 1    nicotine (NICODERM CQ) 21 mg/24 hr Place 1 patch onto the skin once daily. 28 patch 2    tamsulosin (FLOMAX) 0.4 mg Cap Take 1 capsule (0.4 mg total) by mouth once daily. 30 capsule 11    [DISCONTINUED] azithromycin (Z-CHRISTIN) 250 MG tablet Take 2 tablets by mouth on day 1; Take 1 tablet by mouth on days 2-5 6 tablet 0    [DISCONTINUED] clopidogreL (PLAVIX) 75 mg tablet Take 75 mg by mouth once daily.       No facility-administered encounter medications on file as of 7/9/2025.

## 2025-07-18 DIAGNOSIS — M54.32 LEFT SIDED SCIATICA: ICD-10-CM

## 2025-07-18 RX ORDER — METHOCARBAMOL 750 MG/1
750 TABLET, FILM COATED ORAL NIGHTLY PRN
Qty: 30 TABLET | Refills: 0 | Status: SHIPPED | OUTPATIENT
Start: 2025-07-18

## 2025-07-18 NOTE — TELEPHONE ENCOUNTER
Care Due:                  Date            Visit Type   Department     Provider  --------------------------------------------------------------------------------                                EP -                              PRIMARY      [facility] INTERNAL  Last Visit: 06-      CARE ([facility])   MEDICINE       [facility]                              EP -                              PRIMARY      [facility] INTERNAL  Next Visit: 12-      CARE ([facility])   MEDICINE       [facility]                                                            Last  Test          Frequency    Reason                     Performed    Due Date  --------------------------------------------------------------------------------    Lipid Panel.  12 months..  atorvastatin.............  02- 07-    [employer] Embedded Care Due Messages. Reference number: [id number].   7/18/2025 12:31:21 AM CDT

## 2025-07-22 ENCOUNTER — OFFICE VISIT (OUTPATIENT)
Dept: UROLOGY | Facility: CLINIC | Age: 70
End: 2025-07-22
Payer: MEDICARE

## 2025-07-22 ENCOUNTER — LAB VISIT (OUTPATIENT)
Dept: LAB | Facility: HOSPITAL | Age: 70
End: 2025-07-22
Attending: UROLOGY
Payer: COMMERCIAL

## 2025-07-22 VITALS
WEIGHT: 175.5 LBS | RESPIRATION RATE: 18 BRPM | HEART RATE: 76 BPM | SYSTOLIC BLOOD PRESSURE: 114 MMHG | DIASTOLIC BLOOD PRESSURE: 72 MMHG | TEMPERATURE: 98 F | BODY MASS INDEX: 21.82 KG/M2 | HEIGHT: 75 IN

## 2025-07-22 DIAGNOSIS — C61 PROSTATE CANCER: ICD-10-CM

## 2025-07-22 DIAGNOSIS — C61 PROSTATE CANCER: Primary | ICD-10-CM

## 2025-07-22 DIAGNOSIS — R97.20 ELEVATED PSA: ICD-10-CM

## 2025-07-22 LAB
BILIRUBIN, UA POC OHS: NEGATIVE
BLOOD, UA POC OHS: ABNORMAL
CLARITY, UA POC OHS: CLEAR
COLOR, UA POC OHS: YELLOW
CREAT SERPL-MCNC: 1.2 MG/DL (ref 0.5–1.4)
GFR SERPLBLD CREATININE-BSD FMLA CKD-EPI: >60 ML/MIN/1.73/M2
GLUCOSE, UA POC OHS: >=1000
KETONES, UA POC OHS: ABNORMAL
LEUKOCYTES, UA POC OHS: NEGATIVE
NITRITE, UA POC OHS: NEGATIVE
PH, UA POC OHS: 6
PROTEIN, UA POC OHS: NEGATIVE
SPECIFIC GRAVITY, UA POC OHS: 1.01
UROBILINOGEN, UA POC OHS: 1

## 2025-07-22 PROCEDURE — 3008F BODY MASS INDEX DOCD: CPT | Mod: CPTII,S$GLB,, | Performed by: UROLOGY

## 2025-07-22 PROCEDURE — 81003 URINALYSIS AUTO W/O SCOPE: CPT | Mod: QW,S$GLB,, | Performed by: UROLOGY

## 2025-07-22 PROCEDURE — 3288F FALL RISK ASSESSMENT DOCD: CPT | Mod: CPTII,S$GLB,, | Performed by: UROLOGY

## 2025-07-22 PROCEDURE — 4010F ACE/ARB THERAPY RXD/TAKEN: CPT | Mod: CPTII,S$GLB,, | Performed by: UROLOGY

## 2025-07-22 PROCEDURE — 99999 PR PBB SHADOW E&M-EST. PATIENT-LVL IV: CPT | Mod: PBBFAC,,, | Performed by: UROLOGY

## 2025-07-22 PROCEDURE — 1126F AMNT PAIN NOTED NONE PRSNT: CPT | Mod: CPTII,S$GLB,, | Performed by: UROLOGY

## 2025-07-22 PROCEDURE — 36415 COLL VENOUS BLD VENIPUNCTURE: CPT | Mod: PN

## 2025-07-22 PROCEDURE — 1101F PT FALLS ASSESS-DOCD LE1/YR: CPT | Mod: CPTII,S$GLB,, | Performed by: UROLOGY

## 2025-07-22 PROCEDURE — 3044F HG A1C LEVEL LT 7.0%: CPT | Mod: CPTII,S$GLB,, | Performed by: UROLOGY

## 2025-07-22 PROCEDURE — 3078F DIAST BP <80 MM HG: CPT | Mod: CPTII,S$GLB,, | Performed by: UROLOGY

## 2025-07-22 PROCEDURE — 99213 OFFICE O/P EST LOW 20 MIN: CPT | Mod: S$GLB,,, | Performed by: UROLOGY

## 2025-07-22 PROCEDURE — 82565 ASSAY OF CREATININE: CPT

## 2025-07-22 PROCEDURE — 3066F NEPHROPATHY DOC TX: CPT | Mod: CPTII,S$GLB,, | Performed by: UROLOGY

## 2025-07-22 PROCEDURE — 3061F NEG MICROALBUMINURIA REV: CPT | Mod: CPTII,S$GLB,, | Performed by: UROLOGY

## 2025-07-22 PROCEDURE — 1159F MED LIST DOCD IN RCRD: CPT | Mod: CPTII,S$GLB,, | Performed by: UROLOGY

## 2025-07-22 PROCEDURE — 3074F SYST BP LT 130 MM HG: CPT | Mod: CPTII,S$GLB,, | Performed by: UROLOGY

## 2025-07-22 NOTE — PROGRESS NOTES
Chief Complaint   Patient presents with    Follow-up     Biopsy results       History of Present Illness:   Nieves Torres is a 70 y.o. male here for evaluation of Follow-up (Biopsy results)    7/22/25-TRUS biopsy shows Cheryl Grade group 4 prostate cancer. States that he did have gross hematuria for a few days and still has some hematospermia. No fever. He does have a pulmonary nodule, but recently had a PET scan showing no increased uptake.     4/2/25-MRI read as PI RADS 4. Taking flomax. Pt reports nocturia x 1-2, but still doesn't feel like he is emptying. No gross hematuria or dysuria.     3/21/25-Pt lost to follow up after last visit. Never got the MRI. Can't remember why. Pt reports a weak stream and intermittency. Nocturia x 4. Feelings of incomplete emptying. No gross hematuria. Sometimes he has urgency. No dysuria. No longer taking flomax because he ran out.     12/13/22-66yo male, here for elevated PSA of 7.8. Pt denies any prior urologic history. Sometimes he has hesitancy. No gross hematuria. Slight dysuria. Sometimes has incomplete emptying. Nocturia x 2.       Review of Systems   Constitutional:  Negative for chills and fever.   Respiratory:  Negative for shortness of breath.    Cardiovascular:  Negative for chest pain.   Gastrointestinal:  Negative for abdominal pain.   Genitourinary:  Positive for difficulty urinating and nocturia. Negative for dysuria and hematuria.   Neurological:  Negative for dizziness and syncope.   All other systems reviewed and are negative.        Past Medical History:   Diagnosis Date    Coronary artery disease     Emphysema, unspecified     Hypertension     Type 2 diabetes mellitus without complications        Past Surgical History:   Procedure Laterality Date    CAROTID STENT      EXAMINATION UNDER ANESTHESIA Left 12/12/2023    Procedure: Exam under anesthesia;  Surgeon: Scout Lainez MD;  Location: [facility];  Service: General;  Laterality: Left;    INCISION AND DRAINAGE OF  PERIRECTAL REGION N/A 12/12/2023    Procedure: INCISION AND DRAINAGE, PERIRECTAL REGION;  Surgeon: Scout Lainez MD;  Location: [facility] OR;  Service: General;  Laterality: N/A;  abcess    PROSTATE BIOPSY N/A 6/26/2025    Procedure: BIOPSY, PROSTATE;  Surgeon: Patricia Bajwa MD;  Location: [facility] OR;  Service: Urology;  Laterality: N/A;       Family History   Problem Relation Name Age of Onset    Diabetes Mother      Cancer Father      Prostate cancer Father      Breast cancer Neg Hx         Social History     Tobacco Use    Smoking status: Every Day     Current packs/day: 1.00     Average packs/day: 0.8 packs/day for 60.4 years (45.4 ttl pk-yrs)     Types: Cigarettes     Start date: 2/20/1995    Smokeless tobacco: Never    Tobacco comments:     HOLD MIDNIGHT PRIOR to surgery   Vaping Use    Vaping status: Every Day   Substance Use Topics    Alcohol use: Not Currently     Alcohol/week: 21.0 standard drinks of alcohol     Types: 21 Cans of beer per week     Comment: Instructed pt. to stop ETOH beginning today. Pt. verbalized understanding.    Drug use: Never       Current Outpatient Medications   Medication Sig Dispense Refill    albuterol (PROVENTIL/VENTOLIN HFA) 90 mcg/actuation inhaler Inhale 2 puffs into the lungs every 6 (six) hours as needed for Wheezing or Shortness of Breath (cough). Rescue 18 g 5    amoxicillin (AMOXIL) 500 MG capsule Take 1 capsule (500 mg total) by mouth 3 (three) times daily. 30 capsule 0    aspirin (ECOTRIN) 81 MG EC tablet Take 81 mg by mouth once daily.      atorvastatin (LIPITOR) 40 MG tablet Take 1 tablet (40 mg total) by mouth every evening. 90 tablet 1    blood sugar diagnostic Strp To check BG 3 times daily, to use with insurance preferred meter 100 each 11    blood-glucose meter kit To check BG 3 times daily, to use with insurance preferred meter 1 each 0    empagliflozin (JARDIANCE) 10 mg tablet Take 1 tablet (10 mg total) by mouth once daily. 30 tablet 2    glimepiride (AMARYL)  2 MG tablet Take 1 tablet (2 mg total) by mouth once daily. 1300 90 tablet 1    HYDROcodone-acetaminophen (NORCO) 7.5-325 mg per tablet Take 1 tablet by mouth every 6 (six) hours as needed for Pain. 6 tablet 0    irbesartan (AVAPRO) 300 MG tablet Take 1 tablet (300 mg total) by mouth once daily. 90 tablet 1    lancets Misc To check BG 3 times daily, to use with insurance preferred meter 100 each 11    methocarbamoL (ROBAXIN) 750 MG Tab TAKE 1 TABLET (750 MG TOTAL) BY MOUTH NIGHTLY AS NEEDED (MUSCLE SPASM). 30 tablet 0    metoprolol succinate (TOPROL-XL) 50 MG 24 hr tablet Take 1 tablet (50 mg total) by mouth once daily. 90 tablet 1    nicotine (NICODERM CQ) 21 mg/24 hr Place 1 patch onto the skin once daily. 28 patch 2    tamsulosin (FLOMAX) 0.4 mg Cap Take 1 capsule (0.4 mg total) by mouth once daily. 30 capsule 11     No current facility-administered medications for this visit.       Review of patient's allergies indicates:  No Known Allergies    Physical Exam  Vitals:    07/22/25 1309   BP: 114/72   Pulse: 76   Resp: 18   Temp: 97.8 °F (36.6 °C)       General: Well-developed, well-nourished in no acute distress  HEENT: Normocephalic, atraumatic, Extraocular movements intact  Neck: supple, trachea midline, no cervical or supraclavicular lymphadenopathy  Respirations: even and unlabored  Rectal: 3/21/25->40g prostate, no nodules or tenderness. No gross blood  Extremities: atraumatic, moves all equally, no clubbing, cyanosis or edema  Psych: normal affect  Skin: warm and dry, no lesions  Neuro: Alert and oriented, Cranial nerves II-XII grossly intact      Urinalysis  Trace blood, LE and nit negative    PSA  3/21/25: 11.1    Assessment:   1. Prostate cancer  NM Bone Scan Whole Body    Creatinine, Serum    CT Abdomen Pelvis With IV Contrast NO Oral Contrast      2. Elevated PSA  POCT Urinalysis(Instrument)            Plan:  Prostate cancer  -     NM Bone Scan Whole Body; Future; Expected date: 07/22/2025  -      Creatinine, Serum; Future; Expected date: 07/22/2025  -     CT Abdomen Pelvis With IV Contrast NO Oral Contrast; Future; Expected date: 07/22/2025    Elevated PSA  -     POCT Urinalysis(Instrument)      Dicussed his new diagnosis of high risk prostate cancer. Will need further workup with Bone scan and CT scan. Discussed management options of Prostatectomy vs XRT + ADT should the prostate cancer be organ confined.   Pt to f/u after those tests have been completed.

## 2025-07-29 ENCOUNTER — HOSPITAL ENCOUNTER (OUTPATIENT)
Dept: RADIOLOGY | Facility: HOSPITAL | Age: 70
Discharge: HOME | End: 2025-07-29
Attending: UROLOGY
Payer: COMMERCIAL

## 2025-07-29 ENCOUNTER — OFFICE VISIT (OUTPATIENT)
Dept: DIABETES | Facility: CLINIC | Age: 70
End: 2025-07-29
Payer: MEDICARE

## 2025-07-29 VITALS
WEIGHT: 173.31 LBS | HEIGHT: 75 IN | HEART RATE: 76 BPM | BODY MASS INDEX: 21.55 KG/M2 | DIASTOLIC BLOOD PRESSURE: 64 MMHG | SYSTOLIC BLOOD PRESSURE: 118 MMHG | OXYGEN SATURATION: 97 %

## 2025-07-29 DIAGNOSIS — I50.22 CHRONIC SYSTOLIC (CONGESTIVE) HEART FAILURE: ICD-10-CM

## 2025-07-29 DIAGNOSIS — E11.69 HYPERLIPIDEMIA ASSOCIATED WITH TYPE 2 DIABETES MELLITUS: ICD-10-CM

## 2025-07-29 DIAGNOSIS — I15.2 HYPERTENSION ASSOCIATED WITH DIABETES: ICD-10-CM

## 2025-07-29 DIAGNOSIS — E11.9 TYPE 2 DIABETES MELLITUS WITHOUT COMPLICATION, WITHOUT LONG-TERM CURRENT USE OF INSULIN: Primary | ICD-10-CM

## 2025-07-29 DIAGNOSIS — C61 PROSTATE CANCER: ICD-10-CM

## 2025-07-29 DIAGNOSIS — E11.59 HYPERTENSION ASSOCIATED WITH DIABETES: ICD-10-CM

## 2025-07-29 DIAGNOSIS — I25.10 CORONARY ARTERY DISEASE INVOLVING NATIVE CORONARY ARTERY OF NATIVE HEART WITHOUT ANGINA PECTORIS: ICD-10-CM

## 2025-07-29 DIAGNOSIS — E78.5 HYPERLIPIDEMIA ASSOCIATED WITH TYPE 2 DIABETES MELLITUS: ICD-10-CM

## 2025-07-29 LAB — GLUCOSE SERPL-MCNC: 113 MG/DL (ref 70–110)

## 2025-07-29 PROCEDURE — 82962 GLUCOSE BLOOD TEST: CPT | Mod: S$GLB,,, | Performed by: NURSE PRACTITIONER

## 2025-07-29 PROCEDURE — 74177 CT ABD & PELVIS W/CONTRAST: CPT | Mod: 26,,, | Performed by: RADIOLOGY

## 2025-07-29 PROCEDURE — 3044F HG A1C LEVEL LT 7.0%: CPT | Mod: CPTII,S$GLB,, | Performed by: NURSE PRACTITIONER

## 2025-07-29 PROCEDURE — 4010F ACE/ARB THERAPY RXD/TAKEN: CPT | Mod: CPTII,S$GLB,, | Performed by: NURSE PRACTITIONER

## 2025-07-29 PROCEDURE — 3288F FALL RISK ASSESSMENT DOCD: CPT | Mod: CPTII,S$GLB,, | Performed by: NURSE PRACTITIONER

## 2025-07-29 PROCEDURE — 1160F RVW MEDS BY RX/DR IN RCRD: CPT | Mod: CPTII,S$GLB,, | Performed by: NURSE PRACTITIONER

## 2025-07-29 PROCEDURE — 99999 PR PBB SHADOW E&M-EST. PATIENT-LVL IV: CPT | Mod: PBBFAC,,, | Performed by: NURSE PRACTITIONER

## 2025-07-29 PROCEDURE — G2211 COMPLEX E/M VISIT ADD ON: HCPCS | Mod: S$GLB,,, | Performed by: NURSE PRACTITIONER

## 2025-07-29 PROCEDURE — 1101F PT FALLS ASSESS-DOCD LE1/YR: CPT | Mod: CPTII,S$GLB,, | Performed by: NURSE PRACTITIONER

## 2025-07-29 PROCEDURE — 3074F SYST BP LT 130 MM HG: CPT | Mod: CPTII,S$GLB,, | Performed by: NURSE PRACTITIONER

## 2025-07-29 PROCEDURE — 3008F BODY MASS INDEX DOCD: CPT | Mod: CPTII,S$GLB,, | Performed by: NURSE PRACTITIONER

## 2025-07-29 PROCEDURE — 99214 OFFICE O/P EST MOD 30 MIN: CPT | Mod: S$GLB,,, | Performed by: NURSE PRACTITIONER

## 2025-07-29 PROCEDURE — 1125F AMNT PAIN NOTED PAIN PRSNT: CPT | Mod: CPTII,S$GLB,, | Performed by: NURSE PRACTITIONER

## 2025-07-29 PROCEDURE — 74177 CT ABD & PELVIS W/CONTRAST: CPT | Mod: TC,PN

## 2025-07-29 PROCEDURE — 3061F NEG MICROALBUMINURIA REV: CPT | Mod: CPTII,S$GLB,, | Performed by: NURSE PRACTITIONER

## 2025-07-29 PROCEDURE — 3066F NEPHROPATHY DOC TX: CPT | Mod: CPTII,S$GLB,, | Performed by: NURSE PRACTITIONER

## 2025-07-29 PROCEDURE — 1159F MED LIST DOCD IN RCRD: CPT | Mod: CPTII,S$GLB,, | Performed by: NURSE PRACTITIONER

## 2025-07-29 PROCEDURE — 3078F DIAST BP <80 MM HG: CPT | Mod: CPTII,S$GLB,, | Performed by: NURSE PRACTITIONER

## 2025-07-29 PROCEDURE — 25500020 PHARM REV CODE 255: Mod: PN | Performed by: UROLOGY

## 2025-07-29 RX ORDER — GLIMEPIRIDE 2 MG/1
2 TABLET ORAL DAILY
Qty: 90 TABLET | Refills: 1 | Status: SHIPPED | OUTPATIENT
Start: 2025-07-29

## 2025-07-29 RX ADMIN — IOHEXOL 75 ML: 350 INJECTION, SOLUTION INTRAVENOUS at 10:07

## 2025-07-29 NOTE — PROGRESS NOTES
Patient ID: Nieves Torres is a 70 y.o. male.  Patient's current PCP is Oriana Tarango MD.   Collaborating Physician: AV Dixon MD    Chief Complaint: Diabetes Mellitus Consultation   HPI  Nieevs Torres is a 70 y.o. Black or  male presenting for a follow up for diabetes.   Patient has been diagnosed with type 2 diabetes for < 5 years.    Pertinent to decision making is/are the following comorbidities:  CAD, CHF, HTN, HLD, nephropathy   Complications related to diabetes: nephropathy  Recent diabetes related hospitalizations: None   Personal history of pancreatitis: denies  Family history of pancreatic cancer in first-degree relative: denies  Family history of MTC/MEN/endocrine tumors: denies       Previous diabetes education: No  Current diet: 2 meals daily, no snacks. 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 No exercise outside of work.       CURRENT DM MEDICATIONS:   Diabetes Medications              empagliflozin (JARDIANCE) 10 mg tablet Take 1 tablet (10 mg total) by mouth once daily.    glimepiride (AMARYL) 2 MG tablet Take 1 tablet (2 mg total) by mouth once daily.        Past failed treatment(s) include:   Metformin - GI SE       His blood sugar in the clinic today was:   Lab Results   Component Value Date    POCGLU 113 (A) 07/29/2025       Blood glucose testing Not checking   Any episodes of hypoglycemia? No   Glucose trends: ---    Nieves Torres presents to clinic today to discuss diabetes management accompanied by his wife, Debby.   At LOV, initiated Jardiance 10 mg daily. He endorses tolerating this well without  SE at this point. Continues on Glimepiride 2 mg daily - does not take in relation to meals. Advised to start taking with first meal of the day. Not checking blood sugars but does have supplies. We discussed checking at least 3-4 days per week as well as glycemic goals.   Of note, he was recently diagnosed with prostate cancer; planned for  "full body PET scan and will further discuss treatment options with Dr. Bajwa, his urologist.       5/2025: A1c improved to 6.6%   eGFR >70 7/2025, - microalbuminuria    -- Not taking statin consistently. We discussed LDL goal <55 with h/o CAD.   eGFR 80      Of note, Nieves Torres is managed by the following specialists:   Urology -- prostate CA   Cardiology -- LOV Dr. Villanueva 2023 -- CAD, CHF       Statin: Taking  ACE/ARB: Taking    Labs reviewed and are noted below.    Screening or Prevention Patient's value Goal Complete/Controlled?   HgA1C Testing and Control   Lab Results   Component Value Date    HGBA1C 6.6 (H) 05/23/2025      Annually/Less than 8% No   Lipid profile : 04/24/2025 Annually No   LDL control Lab Results   Component Value Date    LDLCALC 112 (H) 02/20/2025    Annually/Less than 100 mg/dl  No   Nephropathy screening Lab Results   Component Value Date    MICALBCREAT 3.4 05/23/2025     Lab Results   Component Value Date    PROTEINUA Negative 06/24/2025    Annually No   Blood pressure BP Readings from Last 1 Encounters:   07/29/25 118/64    Less than 140/90 Yes   Dilated retinal exam : 05/29/2025 Annually Yes    Foot exam   : 07/29/2025 Annually No       Wt Readings from Last 3 Encounters:   07/29/25 1412 78.6 kg (173 lb 4.5 oz)   07/22/25 1309 79.6 kg (175 lb 7.8 oz)   07/09/25 1321 79.8 kg (175 lb 14.8 oz)         Lab Results   Component Value Date    TSH 1.238 11/18/2022    CALCIUM 9.3 06/12/2025     No results found for: "CPEPTIDE"  No results found for: "GLUTAMICACID"  Glucose   Date Value Ref Range Status   06/12/2025 193 (H) 70 - 110 mg/dL Final   12/11/2023 218 (H) 70 - 110 mg/dL Final     Anion Gap   Date Value Ref Range Status   06/12/2025 6 (L) 8 - 16 mmol/L Final           Review of patient's allergies indicates:  No Known Allergies  Social History[1]  Past Medical History:   Diagnosis Date    Coronary artery disease     Emphysema, unspecified     Hypertension     Type 2 diabetes " mellitus without complications        Review of Systems   Constitutional:  Negative for diaphoresis, malaise/fatigue and weight loss.   Eyes:  Negative for blurred vision.   Respiratory:  Negative for shortness of breath.    Cardiovascular:  Negative for chest pain and leg swelling.   Gastrointestinal:  Negative for abdominal pain, constipation, diarrhea, heartburn, nausea and vomiting.   Genitourinary:  Negative for dysuria, frequency and urgency.   Musculoskeletal:  Negative for falls.   Skin:  Negative for rash.   Neurological:  Negative for dizziness, weakness and headaches.   Endo/Heme/Allergies:  Negative for polydipsia.   Psychiatric/Behavioral:  The patient is not nervous/anxious.          Physical Exam  Constitutional:       Appearance: Normal appearance. He is normal weight.   HENT:      Head: Normocephalic and atraumatic.   Neck:      Vascular: No carotid bruit.   Cardiovascular:      Rate and Rhythm: Normal rate and regular rhythm.      Pulses: Normal pulses.      Heart sounds: Normal heart sounds.   Pulmonary:      Effort: Pulmonary effort is normal.      Breath sounds: Normal breath sounds.   Abdominal:      General: Abdomen is flat. Bowel sounds are normal.      Palpations: Abdomen is soft.   Musculoskeletal:         General: Normal range of motion.      Cervical back: Neck supple.      Right lower leg: No edema.      Left lower leg: No edema.   Skin:     General: Skin is warm and dry.   Neurological:      General: No focal deficit present.      Mental Status: He is alert and oriented to person, place, and time.   Psychiatric:         Mood and Affect: Mood normal.         Behavior: Behavior normal.         Thought Content: Thought content normal.         Judgment: Judgment normal.             Assessment and Plan:   Nieves was seen today for diabetes mellitus.    Diagnoses and all orders for this visit:    Type 2 diabetes mellitus without complication, without long-term current use of insulin  -     POCT  Glucose, Hand-Held Device  -     Hemoglobin A1C; Future  -     empagliflozin (JARDIANCE) 10 mg tablet; Take 1 tablet (10 mg total) by mouth once daily.  -     glimepiride (AMARYL) 2 MG tablet; Take 1 tablet (2 mg total) by mouth once daily. 1300    Hypertension associated with diabetes    Hyperlipidemia associated with type 2 diabetes mellitus    Coronary artery disease involving native coronary artery of native heart without angina pectoris    Chronic systolic (congestive) heart failure         Treatment plan for today's visit:   - Continue Glimepiride 2 mg daily before breakfast   - Continue Jardiance 10 mg once a day -- discussed staying well-hydrated with water. Advised to hold this for any situation in which he is unable to eat or drink normally (illness, planned surgery/procedure).     - Advised to start checking BG and keep record of readings - vary readings so that some are fasting, before meals, and after meals. Bring log with you to future appt.     - Discussed LDL goal <55 with his history of DM + CAD. Continue consistent use of Lipitor     - Will let me know if interested in Digital Medicine program.       Patient education:   - Carbohydrates: Limit to 30-45 grams with each meal. Never eat carbs by themselves - always add protein. Make snacks low carb or non-carb only.    - Blood sugar goals:   Fastin-130 mg/dl  2 hours after meal:  mg/dl  Before bed: 100-150 mg/dl    - Carry glucose tablets/soft peppermints/regular juice or Coke product with you at all times to treat a low blood sugar episode (less than 70).  If your blood sugar is between 50-70, Chew 4 tablets or drink 1/2 cup of juice or regular Coke product.   If your blood sugar is below 50, double the treatment.   Re-check blood sugar in 15 minutes. If still low, repeat this.   Always call the clinic to give an update for any low blood sugar episodes.    - Exercise: Goal is 150 minutes per week, which is about 30 minutes/day, 5  days/week. Start slowly and increase as tolerated.      Follow up: 3 months     Visit today included increased complexity associated with the care of the episodic problems addressed and managing the longitudinal care of the patient due to the serious and/or complex managed problem(s)       Lauren O. Landry, FNP-C [facility]            [1]   Social History  Socioeconomic History    Marital status:    Occupational History    Occupation: [employer]   Tobacco Use    Smoking status: Every Day     Current packs/day: 1.00     Average packs/day: 0.8 packs/day for 60.4 years (45.4 ttl pk-yrs)     Types: Cigarettes     Start date: 2/20/1995    Smokeless tobacco: Never    Tobacco comments:     HOLD MIDNIGHT PRIOR to surgery   Vaping Use    Vaping status: Every Day   Substance and Sexual Activity    Alcohol use: Not Currently     Alcohol/week: 21.0 standard drinks of alcohol     Types: 21 Cans of beer per week     Comment: Instructed pt. to stop ETOH beginning today. Pt. verbalized understanding.    Drug use: Never    Sexual activity: Yes     Social Drivers of Health     Financial Resource Strain: Patient Declined (3/20/2025)    Overall Financial Resource Strain (CARDIA)     Difficulty of Paying Living Expenses: Patient declined   Food Insecurity: Patient Declined (3/20/2025)    Hunger Vital Sign     Worried About Running Out of Food in the Last Year: Patient declined     Ran Out of Food in the Last Year: Patient declined   Transportation Needs: Patient Declined (3/20/2025)    PRAPARE - Transportation     Lack of Transportation (Medical): Patient declined     Lack of Transportation (Non-Medical): Patient declined   Physical Activity: Insufficiently Active (3/20/2025)    Exercise Vital Sign     Days of Exercise per Week: 3 days     Minutes of Exercise per Session: 30 min   Stress: No Stress Concern Present (3/20/2025)    [employer] - Occupational  Stress Questionnaire     Feeling of Stress : Not at all   Housing Stability: Unknown (3/20/2025)    Housing Stability Vital Sign     Unable to Pay for Housing in the Last Year: Patient declined     Number of Times Moved in the Last Year: 0     Homeless in the Last Year: No

## 2025-07-29 NOTE — PATIENT INSTRUCTIONS
Medications adjusted for today's visit include:   -- Continue Jardiance 10 mg daily -- stay well-hydrated with water   -- Continue Glimepiride 2 mg daily for now -- take with first meal of the day       Recheck A1c in September, we will adjust therapy as needed   ** will follow up on Digital Medicine enrollment.       Patient education:   Carbohydrates: Limit to 30-45 grams with each meal. Never eat carbs by themselves - always add protein. Make snacks low carb or non-carb only.    Blood sugar goals:   Fastin-130 mg/dl  2 hours after meal:  mg/dl  Before bed: 100-150 mg/dl    Carry glucose tablets/soft peppermints/regular juice or Coke product with you at all times to treat a low blood sugar episode (less than 70).  If your blood sugar is between 50-70, Chew 4 tablets or drink 1/2 cup of juice or regular Coke product.   If your blood sugar is below 50, double the treatment.   Re-check blood sugar in 15 minutes. If still low, repeat this.   Always call the clinic to give an update for any low blood sugar episodes.    Exercise: Goal is 150 minutes per week, which is about 30 minutes/day, 5 days/week. Start slowly and increase as tolerated.

## 2025-07-30 ENCOUNTER — HOSPITAL ENCOUNTER (OUTPATIENT)
Dept: RADIOLOGY | Facility: HOSPITAL | Age: 70
Discharge: HOME | End: 2025-07-30
Attending: UROLOGY
Payer: COMMERCIAL

## 2025-07-30 DIAGNOSIS — C61 PROSTATE CANCER: ICD-10-CM

## 2025-07-30 PROCEDURE — 78306 BONE IMAGING WHOLE BODY: CPT | Mod: TC

## 2025-07-30 PROCEDURE — 78306 BONE IMAGING WHOLE BODY: CPT | Mod: 26,,, | Performed by: RADIOLOGY

## 2025-07-30 PROCEDURE — A9503 TC99M MEDRONATE: HCPCS | Performed by: UROLOGY

## 2025-07-30 RX ORDER — TC 99M MEDRONATE 20 MG/10ML
20.1 INJECTION, POWDER, LYOPHILIZED, FOR SOLUTION INTRAVENOUS
Status: COMPLETED | OUTPATIENT
Start: 2025-07-30 | End: 2025-07-30

## 2025-07-30 RX ADMIN — TECHNETIUM TC 99M MEDRONATE 20.1 MILLICURIE: 20 INJECTION, POWDER, LYOPHILIZED, FOR SOLUTION INTRAVENOUS at 11:07

## 2025-08-05 ENCOUNTER — TELEPHONE (OUTPATIENT)
Dept: UROLOGY | Facility: CLINIC | Age: 70
End: 2025-08-05
Payer: COMMERCIAL

## 2025-08-05 ENCOUNTER — OFFICE VISIT (OUTPATIENT)
Dept: UROLOGY | Facility: CLINIC | Age: 70
End: 2025-08-05
Payer: COMMERCIAL

## 2025-08-05 ENCOUNTER — PRE-OP/PRE-PROCEDURE ORDERS (OUTPATIENT)
Dept: UROLOGY | Facility: CLINIC | Age: 70
End: 2025-08-05
Payer: COMMERCIAL

## 2025-08-05 VITALS
BODY MASS INDEX: 21.43 KG/M2 | SYSTOLIC BLOOD PRESSURE: 138 MMHG | HEART RATE: 93 BPM | RESPIRATION RATE: 18 BRPM | HEIGHT: 75 IN | DIASTOLIC BLOOD PRESSURE: 72 MMHG | WEIGHT: 172.38 LBS

## 2025-08-05 DIAGNOSIS — Z01.818 PRE-OP TESTING: Primary | ICD-10-CM

## 2025-08-05 DIAGNOSIS — C61 PROSTATE CANCER: Primary | ICD-10-CM

## 2025-08-05 DIAGNOSIS — F17.200 SMOKING ADDICTION: ICD-10-CM

## 2025-08-05 DIAGNOSIS — I25.10 CORONARY ARTERY DISEASE INVOLVING NATIVE CORONARY ARTERY OF NATIVE HEART WITHOUT ANGINA PECTORIS: ICD-10-CM

## 2025-08-05 PROCEDURE — 1101F PT FALLS ASSESS-DOCD LE1/YR: CPT | Mod: CPTII,S$GLB,, | Performed by: UROLOGY

## 2025-08-05 PROCEDURE — 1125F AMNT PAIN NOTED PAIN PRSNT: CPT | Mod: CPTII,S$GLB,, | Performed by: UROLOGY

## 2025-08-05 PROCEDURE — 3288F FALL RISK ASSESSMENT DOCD: CPT | Mod: CPTII,S$GLB,, | Performed by: UROLOGY

## 2025-08-05 PROCEDURE — 4010F ACE/ARB THERAPY RXD/TAKEN: CPT | Mod: CPTII,S$GLB,, | Performed by: UROLOGY

## 2025-08-05 PROCEDURE — 3061F NEG MICROALBUMINURIA REV: CPT | Mod: CPTII,S$GLB,, | Performed by: UROLOGY

## 2025-08-05 PROCEDURE — 3051F HG A1C>EQUAL 7.0%<8.0%: CPT | Mod: CPTII,S$GLB,, | Performed by: UROLOGY

## 2025-08-05 PROCEDURE — 3078F DIAST BP <80 MM HG: CPT | Mod: CPTII,S$GLB,, | Performed by: UROLOGY

## 2025-08-05 PROCEDURE — 3008F BODY MASS INDEX DOCD: CPT | Mod: CPTII,S$GLB,, | Performed by: UROLOGY

## 2025-08-05 PROCEDURE — 3075F SYST BP GE 130 - 139MM HG: CPT | Mod: CPTII,S$GLB,, | Performed by: UROLOGY

## 2025-08-05 PROCEDURE — 3066F NEPHROPATHY DOC TX: CPT | Mod: CPTII,S$GLB,, | Performed by: UROLOGY

## 2025-08-05 PROCEDURE — 99214 OFFICE O/P EST MOD 30 MIN: CPT | Mod: S$GLB,,, | Performed by: UROLOGY

## 2025-08-05 PROCEDURE — 99999 PR PBB SHADOW E&M-EST. PATIENT-LVL V: CPT | Mod: PBBFAC,,, | Performed by: UROLOGY

## 2025-08-05 NOTE — TELEPHONE ENCOUNTER
Spoke with bere with [facility] [employer] [facility] and verified information needed for prostates diagnosis

## 2025-08-05 NOTE — TELEPHONE ENCOUNTER
Copied from CRM #[id number]. Topic: General Inquiry - Patient Advice  >> Aug 5, 2025  3:01 PM Jaz wrote:  Type:  Patient Returning Call    Who Called:Govind/ Gretta  Who Left Message for Patient:  Does the patient know what this is regarding?:Cancer Diagnosis  Would the patient rather a call back or a response via [facility]? callback  Best Call Back Number:[phone]  ref# [id number]  Additional Information: Need a callback to verify information

## 2025-08-17 ENCOUNTER — E-CONSULT (OUTPATIENT)
Dept: PULMONOLOGY | Facility: HOSPITAL | Age: 70
End: 2025-08-17
Payer: COMMERCIAL

## 2025-08-17 DIAGNOSIS — Z01.811 PRE-OPERATIVE RESPIRATORY EXAMINATION: Primary | ICD-10-CM

## 2025-08-17 PROCEDURE — 99451 NTRPROF PH1/NTRNET/EHR 5/>: CPT | Mod: ,,, | Performed by: INTERNAL MEDICINE

## 2025-08-20 ENCOUNTER — OFFICE VISIT (OUTPATIENT)
Dept: RADIATION ONCOLOGY | Facility: CLINIC | Age: 70
End: 2025-08-20
Payer: COMMERCIAL

## 2025-08-20 VITALS
BODY MASS INDEX: 21.39 KG/M2 | RESPIRATION RATE: 19 BRPM | OXYGEN SATURATION: 99 % | HEIGHT: 75 IN | DIASTOLIC BLOOD PRESSURE: 82 MMHG | SYSTOLIC BLOOD PRESSURE: 158 MMHG | TEMPERATURE: 99 F | HEART RATE: 78 BPM | WEIGHT: 172 LBS

## 2025-08-20 DIAGNOSIS — C61 PROSTATE CANCER: Primary | ICD-10-CM

## 2025-08-20 PROCEDURE — 3288F FALL RISK ASSESSMENT DOCD: CPT | Mod: CPTII,S$GLB,, | Performed by: SPECIALIST

## 2025-08-20 PROCEDURE — 1126F AMNT PAIN NOTED NONE PRSNT: CPT | Mod: CPTII,S$GLB,, | Performed by: SPECIALIST

## 2025-08-20 PROCEDURE — 3077F SYST BP >= 140 MM HG: CPT | Mod: CPTII,S$GLB,, | Performed by: SPECIALIST

## 2025-08-20 PROCEDURE — 3008F BODY MASS INDEX DOCD: CPT | Mod: CPTII,S$GLB,, | Performed by: SPECIALIST

## 2025-08-20 PROCEDURE — 99999 PR PBB SHADOW E&M-EST. PATIENT-LVL V: CPT | Mod: PBBFAC,,, | Performed by: SPECIALIST

## 2025-08-20 PROCEDURE — 99205 OFFICE O/P NEW HI 60 MIN: CPT | Mod: S$GLB,,, | Performed by: SPECIALIST

## 2025-08-20 PROCEDURE — 3079F DIAST BP 80-89 MM HG: CPT | Mod: CPTII,S$GLB,, | Performed by: SPECIALIST

## 2025-08-20 PROCEDURE — 3066F NEPHROPATHY DOC TX: CPT | Mod: CPTII,S$GLB,, | Performed by: SPECIALIST

## 2025-08-20 PROCEDURE — 1101F PT FALLS ASSESS-DOCD LE1/YR: CPT | Mod: CPTII,S$GLB,, | Performed by: SPECIALIST

## 2025-08-20 PROCEDURE — 3044F HG A1C LEVEL LT 7.0%: CPT | Mod: CPTII,S$GLB,, | Performed by: SPECIALIST

## 2025-08-20 PROCEDURE — 1159F MED LIST DOCD IN RCRD: CPT | Mod: CPTII,S$GLB,, | Performed by: SPECIALIST

## 2025-08-20 PROCEDURE — 4010F ACE/ARB THERAPY RXD/TAKEN: CPT | Mod: CPTII,S$GLB,, | Performed by: SPECIALIST

## 2025-08-20 PROCEDURE — 3061F NEG MICROALBUMINURIA REV: CPT | Mod: CPTII,S$GLB,, | Performed by: SPECIALIST

## 2025-08-20 RX ORDER — BICALUTAMIDE 50 MG/1
50 TABLET, FILM COATED ORAL DAILY
Qty: 30 TABLET | Refills: 0 | Status: SHIPPED | OUTPATIENT
Start: 2025-08-20 | End: 2026-08-20

## 2025-08-22 ENCOUNTER — HOSPITAL ENCOUNTER (OUTPATIENT)
Dept: CARDIOLOGY | Facility: HOSPITAL | Age: 70
Discharge: HOME | End: 2025-08-22
Payer: COMMERCIAL

## 2025-08-22 ENCOUNTER — TELEPHONE (OUTPATIENT)
Dept: UROLOGY | Facility: CLINIC | Age: 70
End: 2025-08-22
Payer: COMMERCIAL

## 2025-08-22 ENCOUNTER — OFFICE VISIT (OUTPATIENT)
Dept: INTERNAL MEDICINE | Facility: CLINIC | Age: 70
End: 2025-08-22
Payer: COMMERCIAL

## 2025-08-22 VITALS
HEIGHT: 75 IN | DIASTOLIC BLOOD PRESSURE: 72 MMHG | BODY MASS INDEX: 21.63 KG/M2 | RESPIRATION RATE: 18 BRPM | OXYGEN SATURATION: 99 % | HEART RATE: 84 BPM | WEIGHT: 173.94 LBS | SYSTOLIC BLOOD PRESSURE: 138 MMHG

## 2025-08-22 DIAGNOSIS — E78.5 HYPERLIPIDEMIA ASSOCIATED WITH TYPE 2 DIABETES MELLITUS: ICD-10-CM

## 2025-08-22 DIAGNOSIS — I25.10 CORONARY ARTERY DISEASE INVOLVING NATIVE CORONARY ARTERY OF NATIVE HEART WITHOUT ANGINA PECTORIS: ICD-10-CM

## 2025-08-22 DIAGNOSIS — E11.69 HYPERLIPIDEMIA ASSOCIATED WITH TYPE 2 DIABETES MELLITUS: ICD-10-CM

## 2025-08-22 DIAGNOSIS — C61 PROSTATE CANCER: ICD-10-CM

## 2025-08-22 DIAGNOSIS — Z01.818 PRE-OPERATIVE CLEARANCE: ICD-10-CM

## 2025-08-22 DIAGNOSIS — I15.2 HYPERTENSION ASSOCIATED WITH DIABETES: ICD-10-CM

## 2025-08-22 DIAGNOSIS — E11.9 TYPE 2 DIABETES MELLITUS WITHOUT COMPLICATION, WITHOUT LONG-TERM CURRENT USE OF INSULIN: ICD-10-CM

## 2025-08-22 DIAGNOSIS — I50.22 CHRONIC SYSTOLIC (CONGESTIVE) HEART FAILURE: ICD-10-CM

## 2025-08-22 DIAGNOSIS — E11.59 HYPERTENSION ASSOCIATED WITH DIABETES: ICD-10-CM

## 2025-08-22 DIAGNOSIS — Z72.0 TOBACCO USE: ICD-10-CM

## 2025-08-22 DIAGNOSIS — M47.26 OSTEOARTHRITIS OF SPINE WITH RADICULOPATHY, LUMBAR REGION: ICD-10-CM

## 2025-08-22 DIAGNOSIS — C61 PROSTATE CANCER: Primary | ICD-10-CM

## 2025-08-22 PROBLEM — R97.20 ELEVATED PSA: Status: RESOLVED | Noted: 2025-03-20 | Resolved: 2025-08-22

## 2025-08-22 LAB
ABSOLUTE EOSINOPHIL (OHS): 0.15 K/UL
ABSOLUTE MONOCYTE (OHS): 0.7 K/UL (ref 0.3–1)
ABSOLUTE NEUTROPHIL COUNT (OHS): 5.57 K/UL (ref 1.8–7.7)
ALBUMIN SERPL BCP-MCNC: 4.4 G/DL (ref 3.5–5.2)
ALP SERPL-CCNC: 97 UNIT/L (ref 40–150)
ALT SERPL W/O P-5'-P-CCNC: 23 UNIT/L (ref 0–55)
ANION GAP (OHS): 11 MMOL/L (ref 8–16)
APTT PPP: 24.8 SECONDS (ref 21–32)
AST SERPL-CCNC: 27 UNIT/L (ref 0–50)
BASOPHILS # BLD AUTO: 0.04 K/UL
BASOPHILS NFR BLD AUTO: 0.5 %
BILIRUB SERPL-MCNC: 0.6 MG/DL (ref 0.1–1)
BILIRUB UR QL STRIP.AUTO: NEGATIVE
BUN SERPL-MCNC: 12 MG/DL (ref 8–23)
CALCIUM SERPL-MCNC: 9.3 MG/DL (ref 8.7–10.5)
CHLORIDE SERPL-SCNC: 104 MMOL/L (ref 95–110)
CHOLEST SERPL-MCNC: 182 MG/DL (ref 120–199)
CHOLEST/HDLC SERPL: 3.5 {RATIO} (ref 2–5)
CLARITY UR: CLEAR
CO2 SERPL-SCNC: 24 MMOL/L (ref 23–29)
COLOR UR AUTO: YELLOW
CREAT SERPL-MCNC: 1 MG/DL (ref 0.5–1.4)
EAG (OHS): 160 MG/DL (ref 68–131)
ERYTHROCYTE [DISTWIDTH] IN BLOOD BY AUTOMATED COUNT: 13.8 % (ref 11.5–14.5)
GFR SERPLBLD CREATININE-BSD FMLA CKD-EPI: >60 ML/MIN/1.73/M2
GLUCOSE SERPL-MCNC: 158 MG/DL (ref 70–110)
GLUCOSE UR QL STRIP: NEGATIVE
HBA1C MFR BLD: 7.2 % (ref 4–5.6)
HCT VFR BLD AUTO: 39.3 % (ref 40–54)
HDLC SERPL-MCNC: 52 MG/DL (ref 40–75)
HDLC SERPL: 28.6 % (ref 20–50)
HGB BLD-MCNC: 13 GM/DL (ref 14–18)
HGB UR QL STRIP: NEGATIVE
HOLD SPECIMEN: NORMAL
IMM GRANULOCYTES # BLD AUTO: 0.03 K/UL (ref 0–0.04)
IMM GRANULOCYTES NFR BLD AUTO: 0.3 % (ref 0–0.5)
INR PPP: 1 (ref 0.8–1.2)
KETONES UR QL STRIP: NEGATIVE
LDLC SERPL CALC-MCNC: 111.4 MG/DL (ref 63–159)
LEUKOCYTE ESTERASE UR QL STRIP: ABNORMAL
LYMPHOCYTES # BLD AUTO: 2.26 K/UL (ref 1–4.8)
MCH RBC QN AUTO: 31.5 PG (ref 27–31)
MCHC RBC AUTO-ENTMCNC: 33.1 G/DL (ref 32–36)
MCV RBC AUTO: 95 FL (ref 82–98)
MICROSCOPIC COMMENT: NORMAL
NITRITE UR QL STRIP: NEGATIVE
NONHDLC SERPL-MCNC: 130 MG/DL
NUCLEATED RBC (/100WBC) (OHS): 0 /100 WBC
OHS QRS DURATION: 82 MS
OHS QTC CALCULATION: 430 MS
PH UR STRIP: 7 [PH]
PLATELET # BLD AUTO: 295 K/UL (ref 150–450)
PMV BLD AUTO: 9.4 FL (ref 9.2–12.9)
POTASSIUM SERPL-SCNC: 4.3 MMOL/L (ref 3.5–5.1)
PROT SERPL-MCNC: 8.4 GM/DL (ref 6–8.4)
PROT UR QL STRIP: NEGATIVE
PROTHROMBIN TIME: 11.1 SECONDS (ref 9–12.5)
RBC # BLD AUTO: 4.13 M/UL (ref 4.6–6.2)
RBC #/AREA URNS HPF: 1 /HPF (ref 0–4)
RELATIVE EOSINOPHIL (OHS): 1.7 %
RELATIVE LYMPHOCYTE (OHS): 25.8 % (ref 18–48)
RELATIVE MONOCYTE (OHS): 8 % (ref 4–15)
RELATIVE NEUTROPHIL (OHS): 63.7 % (ref 38–73)
SODIUM SERPL-SCNC: 139 MMOL/L (ref 136–145)
SP GR UR STRIP: <=1.005
SQUAMOUS #/AREA URNS HPF: 1 /HPF
TRIGL SERPL-MCNC: 93 MG/DL (ref 30–150)
TSH SERPL-ACNC: 1.33 UIU/ML (ref 0.4–4)
WBC # BLD AUTO: 8.75 K/UL (ref 3.9–12.7)
WBC #/AREA URNS HPF: 2 /HPF (ref 0–5)

## 2025-08-22 PROCEDURE — 84295 ASSAY OF SERUM SODIUM: CPT | Performed by: FAMILY MEDICINE

## 2025-08-22 PROCEDURE — 85025 COMPLETE CBC W/AUTO DIFF WBC: CPT | Performed by: FAMILY MEDICINE

## 2025-08-22 PROCEDURE — 85610 PROTHROMBIN TIME: CPT | Performed by: FAMILY MEDICINE

## 2025-08-22 PROCEDURE — 82465 ASSAY BLD/SERUM CHOLESTEROL: CPT | Performed by: FAMILY MEDICINE

## 2025-08-22 PROCEDURE — 99999 PR PBB SHADOW E&M-EST. PATIENT-LVL V: CPT | Mod: PBBFAC,,, | Performed by: FAMILY MEDICINE

## 2025-08-22 PROCEDURE — 93005 ELECTROCARDIOGRAM TRACING: CPT

## 2025-08-22 PROCEDURE — 81003 URINALYSIS AUTO W/O SCOPE: CPT | Performed by: FAMILY MEDICINE

## 2025-08-22 PROCEDURE — 93010 ELECTROCARDIOGRAM REPORT: CPT | Mod: ,,, | Performed by: INTERNAL MEDICINE

## 2025-08-22 PROCEDURE — 83036 HEMOGLOBIN GLYCOSYLATED A1C: CPT | Performed by: FAMILY MEDICINE

## 2025-08-22 PROCEDURE — 85730 THROMBOPLASTIN TIME PARTIAL: CPT | Performed by: FAMILY MEDICINE

## 2025-08-22 PROCEDURE — 84443 ASSAY THYROID STIM HORMONE: CPT | Performed by: FAMILY MEDICINE

## 2025-08-22 RX ORDER — LANCETS 33 GAUGE
EACH MISCELLANEOUS 3 TIMES DAILY
COMMUNITY
Start: 2025-05-23

## 2025-08-23 DIAGNOSIS — I15.2 HYPERTENSION ASSOCIATED WITH DIABETES: ICD-10-CM

## 2025-08-23 DIAGNOSIS — E11.59 HYPERTENSION ASSOCIATED WITH DIABETES: ICD-10-CM

## 2025-08-24 RX ORDER — IRBESARTAN 300 MG/1
300 TABLET ORAL DAILY
Qty: 90 TABLET | Refills: 3 | Status: SHIPPED | OUTPATIENT
Start: 2025-08-24

## 2025-08-25 ENCOUNTER — TELEPHONE (OUTPATIENT)
Dept: INTERNAL MEDICINE | Facility: CLINIC | Age: 70
End: 2025-08-25
Payer: COMMERCIAL

## 2025-08-25 DIAGNOSIS — M47.26 OSTEOARTHRITIS OF SPINE WITH RADICULOPATHY, LUMBAR REGION: Primary | ICD-10-CM

## 2025-08-28 ENCOUNTER — TELEPHONE (OUTPATIENT)
Dept: UROLOGY | Facility: CLINIC | Age: 70
End: 2025-08-28
Payer: COMMERCIAL

## 2025-09-04 ENCOUNTER — OFFICE VISIT (OUTPATIENT)
Dept: UROLOGY | Facility: CLINIC | Age: 70
End: 2025-09-04
Payer: COMMERCIAL

## 2025-09-04 VITALS
TEMPERATURE: 98 F | DIASTOLIC BLOOD PRESSURE: 70 MMHG | RESPIRATION RATE: 17 BRPM | BODY MASS INDEX: 21.43 KG/M2 | HEIGHT: 75 IN | SYSTOLIC BLOOD PRESSURE: 120 MMHG | HEART RATE: 99 BPM | WEIGHT: 172.38 LBS

## 2025-09-04 DIAGNOSIS — C61 PROSTATE CANCER: Primary | ICD-10-CM

## 2025-09-04 DIAGNOSIS — Z01.818 PRE-OP TESTING: ICD-10-CM

## 2025-09-04 DIAGNOSIS — N52.1 ERECTILE DYSFUNCTION ASSOCIATED WITH TYPE 2 DIABETES MELLITUS: ICD-10-CM

## 2025-09-04 DIAGNOSIS — E11.69 ERECTILE DYSFUNCTION ASSOCIATED WITH TYPE 2 DIABETES MELLITUS: ICD-10-CM

## 2025-09-04 DIAGNOSIS — Z01.818 PREOPERATIVE CLEARANCE: ICD-10-CM

## 2025-09-04 DIAGNOSIS — R97.20 ELEVATED PSA: ICD-10-CM

## 2025-09-04 LAB
BILIRUBIN, UA POC OHS: NEGATIVE
BLOOD, UA POC OHS: NEGATIVE
CLARITY, UA POC OHS: CLEAR
COLOR, UA POC OHS: YELLOW
GLUCOSE, UA POC OHS: >=1000
KETONES, UA POC OHS: NEGATIVE
LEUKOCYTES, UA POC OHS: NEGATIVE
NITRITE, UA POC OHS: NEGATIVE
PH, UA POC OHS: 5.5
PROTEIN, UA POC OHS: NEGATIVE
SPECIFIC GRAVITY, UA POC OHS: 1.01
UROBILINOGEN, UA POC OHS: 1

## 2025-09-04 PROCEDURE — 99999 PR PBB SHADOW E&M-EST. PATIENT-LVL V: CPT | Mod: PBBFAC,,, | Performed by: UROLOGY

## 2025-09-04 RX ORDER — CEFAZOLIN SODIUM 2 G/50ML
2 SOLUTION INTRAVENOUS
OUTPATIENT
Start: 2025-09-04

## (undated) DEVICE — TOWEL OR DISP STRL BLUE 4/PK

## (undated) DEVICE — SUT MONOCYRL 4-0 PS2 UND

## (undated) DEVICE — DRAPE THREE-QTR REINF 53X77IN

## (undated) DEVICE — PANTIES FEMININE NAPKIN LG/XLG

## (undated) DEVICE — DRAPE LAP T SHT W/ INSTR PAD

## (undated) DEVICE — CONNECTOR TUBING STR 5 IN 1

## (undated) DEVICE — SYR LUER LOCK STERILE 10ML

## (undated) DEVICE — MANIFOLD 4 PORT

## (undated) DEVICE — TUBING MEDI-VAC 20FT .25IN

## (undated) DEVICE — SUT VICRYL 0 SH

## (undated) DEVICE — SUT VICRYL 3-0 27 SH

## (undated) DEVICE — APPLICATOR CHLORAPREP ORN 26ML

## (undated) DEVICE — GOWN POLY REINF BRTH SLV XL

## (undated) DEVICE — SUT PERMA HAND SILK BLK 0-0

## (undated) DEVICE — NDL MAXCORE BIOPSY 18G 25CM

## (undated) DEVICE — COVER PROXIMA MAYO STAND

## (undated) DEVICE — NDL ECLIPSE SAFETY 23G 1.5IN

## (undated) DEVICE — SPONGE COTTON TRAY 4X4IN

## (undated) DEVICE — SUT CTD VICRYL 2-0 UND BR

## (undated) DEVICE — SOL NORMAL USPCA 0.9%

## (undated) DEVICE — TAPE SILK 3IN

## (undated) DEVICE — SUT SILK 2-0 STRANDS 30IN

## (undated) DEVICE — DRESSING TELFA N ADH 3X8

## (undated) DEVICE — SUT PROLENE 2-0 SH 36IN BLU

## (undated) DEVICE — PACK BASIC SETUP SC BR

## (undated) DEVICE — JELLY SURGILUBE LUBE PKT 3GM

## (undated) DEVICE — SUT VICRYL PLUS 3-0 PS2 18

## (undated) DEVICE — ELECTRODE REM PLYHSV RETURN 9

## (undated) DEVICE — GUN BIOPSY 18GA MONOPLY

## (undated) DEVICE — CONTAINER SPECIMEN OR STER 4OZ

## (undated) DEVICE — GLOVE SURGICAL LATEX SZ 7

## (undated) DEVICE — GLOVE SIGNATURE ESSNTL LTX 7.5

## (undated) DEVICE — INSERT CUSHIONPRONE VIEW LARGE

## (undated) DEVICE — COVER LIGHT HANDLE 80/CA